# Patient Record
Sex: FEMALE | Race: WHITE | Employment: FULL TIME | URBAN - METROPOLITAN AREA
[De-identification: names, ages, dates, MRNs, and addresses within clinical notes are randomized per-mention and may not be internally consistent; named-entity substitution may affect disease eponyms.]

---

## 2017-01-13 ENCOUNTER — RADIANT APPOINTMENT (OUTPATIENT)
Dept: GENERAL RADIOLOGY | Facility: CLINIC | Age: 62
End: 2017-01-13
Attending: FAMILY MEDICINE
Payer: COMMERCIAL

## 2017-01-13 ENCOUNTER — OFFICE VISIT (OUTPATIENT)
Dept: FAMILY MEDICINE | Facility: CLINIC | Age: 62
End: 2017-01-13
Payer: COMMERCIAL

## 2017-01-13 VITALS
WEIGHT: 157 LBS | BODY MASS INDEX: 30.82 KG/M2 | TEMPERATURE: 98.4 F | SYSTOLIC BLOOD PRESSURE: 157 MMHG | DIASTOLIC BLOOD PRESSURE: 76 MMHG | HEART RATE: 56 BPM | HEIGHT: 60 IN

## 2017-01-13 DIAGNOSIS — R05.9 COUGH: ICD-10-CM

## 2017-01-13 DIAGNOSIS — J45.21 MILD INTERMITTENT ASTHMA WITH ACUTE EXACERBATION: ICD-10-CM

## 2017-01-13 DIAGNOSIS — J40 BRONCHITIS: ICD-10-CM

## 2017-01-13 DIAGNOSIS — R05.9 COUGH: Primary | ICD-10-CM

## 2017-01-13 PROCEDURE — 99214 OFFICE O/P EST MOD 30 MIN: CPT | Performed by: FAMILY MEDICINE

## 2017-01-13 PROCEDURE — 71020 XR CHEST 2 VW: CPT

## 2017-01-13 RX ORDER — AZITHROMYCIN 250 MG/1
250 TABLET, FILM COATED ORAL DAILY
Qty: 6 TABLET | Refills: 1 | Status: SHIPPED | OUTPATIENT
Start: 2017-01-13 | End: 2017-01-31

## 2017-01-13 RX ORDER — HYDROCHLOROTHIAZIDE 25 MG/1
TABLET ORAL
COMMUNITY
Start: 2016-07-14 | End: 2017-01-13

## 2017-01-13 RX ORDER — PREDNISONE 20 MG/1
20 TABLET ORAL DAILY
Qty: 5 TABLET | Refills: 2 | Status: SHIPPED | OUTPATIENT
Start: 2017-01-13 | End: 2017-01-31

## 2017-01-13 NOTE — MR AVS SNAPSHOT
After Visit Summary   1/13/2017    Rhonda Casiano    MRN: [id number]           Patient Information     Date Of Birth          1955        Visit Information        Provider Department      1/13/2017 8:40 AM Maritza Ramirez MD [facility]        Today's Diagnoses     Cough    -  1     Bronchitis           Care Instructions          Thank you for choosing [facility].  You may be receiving a survey in the mail from [facility] regarding your visit today.  Please take a few minutes to complete and return the survey to let us know how we are doing.      If you have questions or concerns, please contact us via [employer] or you can contact your care team at [phone].    Our Clinic hours are:  Monday 6:40 am  to 7:00 pm  Tuesday -Friday 6:40 am to 5:00 pm    The [place] outpatient lab hours are:  Monday - Friday 6:10 am to 4:45 pm  Saturdays 7:00 am to 11:00 am  Appointments are required, call [phone]    If you have clinical questions after hours or would like to schedule an appointment,  call the clinic at [phone].          Follow-ups after your visit        Who to contact     If you have questions or need follow up information about today's clinic visit or your schedule please contact [facility] directly at [phone].  Normal or non-critical lab and imaging results will be communicated to you by [employer], letter or phone within 4 business days after the clinic has received the results. If you do not hear from us within 7 days, please contact the clinic through [employer] or phone. If you have a critical or abnormal lab result, we will notify you by phone as soon as possible.  Submit refill requests through [employer] or call your pharmacy and they will forward the refill request to us. Please allow 3 business days for your refill to be completed.          Additional Information About Your Visit        [employer] Information     [employer] gives you secure  access to your electronic health record. If you see a primary care provider, you can also send messages to your care team and make appointments. If you have questions, please call your primary care clinic.  If you do not have a primary care provider, please call [phone] and they will assist you.        Care EveryWhere ID     This is your Care EveryWhere ID. This could be used by other organizations to access your [facility] medical records  [id number]        Your Vitals Were     Pulse Temperature Height BMI (Body Mass Index) Last Period       56 98.4  F (36.9  C) (Tympanic) 5' (1.524 m) 30.66 kg/m2 06/28/2005        Blood Pressure from Last 3 Encounters:   01/13/17 157/76   10/17/16 134/62   10/13/16 152/72    Weight from Last 3 Encounters:   01/13/17 157 lb (71.215 kg)   10/13/16 160 lb (72.576 kg)   05/03/16 160 lb (72.576 kg)                 Today's Medication Changes          These changes are accurate as of: 1/13/17  9:15 AM.  If you have any questions, ask your nurse or doctor.               Start taking these medicines.        Dose/Directions    azithromycin 250 MG tablet   Commonly known as:  ZITHROMAX Z-ISABELA   Used for:  Bronchitis   Started by:  Maritza Ramirez MD        Dose:  250 mg   Take 1 tablet (250 mg) by mouth daily Two tablets first day, then one tablet daily for four days   Quantity:  6 tablet   Refills:  1       predniSONE 20 MG tablet   Commonly known as:  DELTASONE   Used for:  Cough, Bronchitis   Started by:  Maritza Ramirez MD        Dose:  20 mg   Take 1 tablet (20 mg) by mouth daily   Quantity:  5 tablet   Refills:  2         These medicines have changed or have updated prescriptions.        Dose/Directions    albuterol 108 (90 BASE) MCG/ACT Inhaler   Commonly known as:  PROAIR HFA/PROVENTIL HFA/VENTOLIN HFA   This may have changed:  Another medication with the same name was removed. Continue taking this medication, and follow the directions you see here.   Used for:  Mild  intermittent asthma, uncomplicated   Changed by:  Betina Barfield DO        Dose:  2 puff   Inhale 2 puffs into the lungs every 6 hours as needed for shortness of breath / dyspnea   Quantity:  1 Inhaler   Refills:  11         Stop taking these medicines if you haven't already. Please contact your care team if you have questions.     hydrochlorothiazide 25 MG tablet   Commonly known as:  HYDRODIURIL   Stopped by:  Maritza Ramirez MD                Where to get your medicines      These medications were sent to [facility] [address] [place] [address]  [address] [place] [id number]     Phone:  [phone]    - azithromycin 250 MG tablet  - predniSONE 20 MG tablet             Primary Care Provider Office Phone # Fax #    Betina Barfield [phone]       [facility] [address] [place] [id number]        Thank you!     Thank you for choosing [facility]  for your care. Our goal is always to provide you with excellent care. Hearing back from our patients is one way we can continue to improve our services. Please take a few minutes to complete the written survey that you may receive in the mail after your visit with us. Thank you!             Your Updated Medication List - Protect others around you: Learn how to safely use, store and throw away your medicines at www.disposemymeds.org.          This list is accurate as of: 1/13/17  9:15 AM.  Always use your most recent med list.                   Brand Name Dispense Instructions for use    acyclovir 400 MG tablet    ZOVIRAX    90 tablet    ONE TABLET 3 TIMES DAILY when needed       albuterol 108 (90 BASE) MCG/ACT Inhaler    PROAIR HFA/PROVENTIL HFA/VENTOLIN HFA    1 Inhaler    Inhale 2 puffs into the lungs every 6 hours as needed for shortness of breath / dyspnea       aspirin 81 MG tablet     30 tablet    Take 81 mg by mouth every evening       azithromycin 250 MG tablet     ZITHROMAX Z-ISABELA    6 tablet    Take 1 tablet (250 mg) by mouth daily Two tablets first day, then one tablet daily for four days       BLOOD GLUCOSE TEST STRIPS STRP     one month    one touch ultra- test threee times a day       DIABETIC STERILE LANCETS device     one month    test three times a day - one touch ultra       fluticasone-salmeterol 250-50 MCG/DOSE diskus inhaler    ADVAIR DISKUS    3 Inhaler    Inhale 1 puff into the lungs 2 times daily       GLUCOSAMINE-CHONDROITIN PO      Take 1 tablet by mouth every evening       ibuprofen 600 MG tablet    ADVIL/MOTRIN     Take 600 mg by mouth every 6 hours as needed.       lisinopril 10 MG tablet    PRINIVIL/ZESTRIL    90 tablet    Take 1 tablet (10 mg) by mouth daily       MULTIVITAMIN PO      Take 1 tablet by mouth every evening       OMEPRAZOLE PO      Take 40 mg by mouth three times a week       predniSONE 20 MG tablet    DELTASONE    5 tablet    Take 1 tablet (20 mg) by mouth daily       simvastatin 20 MG tablet    ZOCOR    90 tablet    Take 1 tablet (20 mg) by mouth At Bedtime       STOOL SOFTENER PO      Take 1 tablet by mouth daily       vitamin B complex with vitamin C Tabs tablet      Take 1 tablet by mouth every evening       vitamin D 1000 UNITS capsule      Take 1 capsule by mouth daily.

## 2017-01-13 NOTE — PATIENT INSTRUCTIONS
Thank you for choosing [facility].  You may be receiving a survey in the mail from [facility] regarding your visit today.  Please take a few minutes to complete and return the survey to let us know how we are doing.      If you have questions or concerns, please contact us via [employer] or you can contact your care team at [phone].    Our Clinic hours are:  Monday 6:40 am  to 7:00 pm  Tuesday -Friday 6:40 am to 5:00 pm    The [place] outpatient lab hours are:  Monday - Friday 6:10 am to 4:45 pm  Saturdays 7:00 am to 11:00 am  Appointments are required, call [phone]    If you have clinical questions after hours or would like to schedule an appointment,  call the clinic at [phone].

## 2017-01-13 NOTE — NURSING NOTE
Chief Complaint   Patient presents with     URI     sick for 6 days       Initial /76 mmHg  Pulse 56  Temp(Src) 98.4  F (36.9  C) (Tympanic)  Ht 5' (1.524 m)  Wt 157 lb (71.215 kg)  BMI 30.66 kg/m2  LMP 06/28/2005 Estimated body mass index is 30.66 kg/(m^2) as calculated from the following:    Height as of this encounter: 5' (1.524 m).    Weight as of this encounter: 157 lb (71.215 kg).  BP completed using cuff size: regular

## 2017-01-13 NOTE — PROGRESS NOTES
a  SUBJECTIVE:                                                    Rhonda Casiano is 61 year old female   Chief Complaint   Patient presents with     URI     sick for 6 days     ENT Symptoms             Symptoms: cc Present Absent Comment   Fever/Chills  x  Fever of 101.7   Fatigue  x  Fatigued and weight loss   Muscle Aches   x    Eye Irritation   x    Sneezing   x    Nasal Alok/Drg  x     Sinus Pressure/Pain   x    Loss of smell   x    Dental pain   x    Sore Throat   x    Swollen Glands   x    Ear Pain/Fullness   x    Cough x x  Deep cough   Wheeze x x  Intermittent wheezing   Chest Pain   x    Shortness of breath x x     Rash   x    Other         Symptom duration:  6 week   Symptom severity:  Moderate   Treatments tried:  Inhalers and Nyquil   Contacts:  None         Problem list and histories reviewed & adjusted, as indicated.  Additional history: as documented    Patient Active Problem List   Diagnosis     Mild intermittent asthma     Benign essential hypertension     Other unspecified back disorder     Cystocele, midline     Herpes simplex virus (HSV) infection     Predominant disturbance of emotions     Type 2 diabetes mellitus without complication, without long-term current use of insulin (H)     HYPERLIPIDEMIA LDL GOAL <100     Advanced directives, counseling/discussion     Past Surgical History   Procedure Laterality Date     Surgical history of -        tubal ligation     Surgical history of -   2005     colonoscopy neg     Genitourinary surgery       colporrhaphy       Social History   Substance Use Topics     Smoking status: Never Smoker      Smokeless tobacco: Never Used     Alcohol Use: Yes      Comment: occas- 2-4 drinks per month     Family History   Problem Relation Age of Onset     Hypertension Mother      COPD     Respiratory Mother      emphysema     Depression Mother      Lipids Father      Hypertension Father      C.A.D. Father      quad by pass, valve replacement     HEART DISEASE Father       rheumatic fever     CANCER Maternal Grandmother      gallbladder     Arthritis Maternal Grandmother      HEART DISEASE Maternal Grandfather      CHF     DIABETES Paternal Grandmother      Respiratory Brother      Alcohol/Drug Brother      alcohol     Asthma Brother      DIABETES Brother      Lipids Brother      Eye Surgery Brother      cornea transplant     Depression Daughter      Thyroid Disease Brother      thyroid cancer     CANCER Brother      thyroid cancer     Gynecology Sister      history of molar pregnancy     Depression Sister      GASTROINTESTINAL DISEASE Sister      gallbladder disease         Current Outpatient Prescriptions   Medication Sig Dispense Refill     azithromycin (ZITHROMAX Z-ISABELA) 250 MG tablet Take 1 tablet (250 mg) by mouth daily Two tablets first day, then one tablet daily for four days 6 tablet 1     predniSONE (DELTASONE) 20 MG tablet Take 1 tablet (20 mg) by mouth daily 5 tablet 2     simvastatin (ZOCOR) 20 MG tablet Take 1 tablet (20 mg) by mouth At Bedtime 90 tablet 3     fluticasone-salmeterol (ADVAIR DISKUS) 250-50 MCG/DOSE diskus inhaler Inhale 1 puff into the lungs 2 times daily 3 Inhaler 3     albuterol (PROAIR HFA, PROVENTIL HFA, VENTOLIN HFA) 108 (90 BASE) MCG/ACT inhaler Inhale 2 puffs into the lungs every 6 hours as needed for shortness of breath / dyspnea 1 Inhaler 11     lisinopril (PRINIVIL,ZESTRIL) 10 MG tablet Take 1 tablet (10 mg) by mouth daily 90 tablet 3     GLUCOSAMINE-CHONDROITIN PO Take 1 tablet by mouth every evening       OMEPRAZOLE PO Take 40 mg by mouth three times a week       aspirin 81 MG tablet Take 81 mg by mouth every evening  30 tablet      Multiple Vitamins-Minerals (MULTIVITAMIN OR) Take 1 tablet by mouth every evening        vitamin  B complex with vitamin C (VITAMIN  B COMPLEX) TABS Take 1 tablet by mouth every evening        Docusate Calcium (STOOL SOFTENER PO) Take 1 tablet by mouth daily       acyclovir (ZOVIRAX) 400 MG tablet ONE TABLET 3  TIMES DAILY when needed 90 tablet 3     Cholecalciferol (VITAMIN D) 1000 UNITS capsule Take 1 capsule by mouth daily.       [DISCONTINUED] albuterol (2.5 MG/3ML) 0.083% nebulizer solution Take 1 vial (2.5 mg) by nebulization every 6 hours as needed for shortness of breath / dyspnea or wheezing 30 vial 0     ibuprofen (ADVIL,MOTRIN) 600 MG tablet Take 600 mg by mouth every 6 hours as needed.       DIABETIC STERILE LANCETS MISC test three times a day - one touch ultra one month one year     BLOOD GLUCOSE TEST STRIPS STRP one touch ultra- test threee times a day one month one year     No Known Allergies  Recent Labs   Lab Test  10/13/16   0740  04/03/16   1315  04/08/15   0652  07/22/14   0801   09/05/12   0745  09/22/11   0929   A1C  6.4*   --   6.5*  6.8*   < >  6.2*  6.5*   LDL  71   --   81  77   < >  106  87   HDL  49*   --   56  36*   < >  48*  41*   TRIG  110   --   121  73   < >  101  96   ALT   --   25   --    --    --   26  20   CR   --   0.84  0.79   --    < >  0.84  0.75   GFRESTIMATED   --   68  74   --    < >  70  80   GFRESTBLACK   --   83  90   --    < >  85  >90   POTASSIUM   --   3.5  3.6   --    < >  4.2  4.2   TSH  0.73   --    --    --    --   1.18   --     < > = values in this interval not displayed.      BP Readings from Last 3 Encounters:   01/13/17 157/76   10/17/16 134/62   10/13/16 152/72    Wt Readings from Last 3 Encounters:   01/13/17 157 lb (71.215 kg)   10/13/16 160 lb (72.576 kg)   05/03/16 160 lb (72.576 kg)         ROS:  Constitutional, HEENT, cardiovascular, pulmonary, gi and gu systems are negative, except as otherwise noted.    OBJECTIVE:                                                    /76 mmHg  Pulse 56  Temp(Src) 98.4  F (36.9  C) (Tympanic)  Ht 5' (1.524 m)  Wt 157 lb (71.215 kg)  BMI 30.66 kg/m2  LMP 06/28/2005  GENERAL APPEARANCE ADULT: alert, appears ill, no distress  HENT: right TM abnormal, dull, left TM abnormal, dull, throat/mouth:mild erythema, mucous  membranes moist,  cobblestoning and thick mucous posterior pharynx.  RESP: lungs clear to auscultation , rhonchi clear with cough, expiratory wheezes on right  CV: normal rate, regular rhythm, no murmur or gallop  Diagnostic Test Results:  Chest xray appears clear     ASSESSMENT/PLAN:                                                    1. Cough  History of intermittent asthma, with exaberation  - XR Chest 2 Views; Future    2. Bronchitis  Viral vs bacterial.  Trial of antibiotics but if not effective viral infection and self limiting.  If effective and recurs will repeat, see patient instructions.  - azithromycin (ZITHROMAX Z-ISABELA) 250 MG tablet; Take 1 tablet (250 mg) by mouth daily Two tablets first day, then one tablet daily for four days  Dispense: 6 tablet; Refill: 1  - predniSONE (DELTASONE) 20 MG tablet; Take 1 tablet (20 mg) by mouth daily  Dispense: 5 tablet; Refill: 2      3. Mild intermittent asthma with acute exacerbation  as listed above    Maritza Ramirez MD  [facility]

## 2017-01-31 ENCOUNTER — OFFICE VISIT (OUTPATIENT)
Dept: FAMILY MEDICINE | Facility: CLINIC | Age: 62
End: 2017-01-31
Payer: COMMERCIAL

## 2017-01-31 VITALS
TEMPERATURE: 98.7 F | HEART RATE: 78 BPM | DIASTOLIC BLOOD PRESSURE: 88 MMHG | BODY MASS INDEX: 31.02 KG/M2 | HEIGHT: 60 IN | RESPIRATION RATE: 20 BRPM | SYSTOLIC BLOOD PRESSURE: 168 MMHG | WEIGHT: 158 LBS

## 2017-01-31 DIAGNOSIS — K04.7 DENTAL ABSCESS: Primary | ICD-10-CM

## 2017-01-31 DIAGNOSIS — R91.8 PULMONARY NODULES: ICD-10-CM

## 2017-01-31 PROCEDURE — 99213 OFFICE O/P EST LOW 20 MIN: CPT | Performed by: INTERNAL MEDICINE

## 2017-01-31 NOTE — NURSING NOTE
Chief Complaint   Patient presents with     Dental Problem     abcess and can't see her doctor for a week.  Needs antibiotic       Initial /88 mmHg  Pulse 78  Temp(Src) 98.7  F (37.1  C) (Tympanic)  Resp 20  Ht 5' (1.524 m)  Wt 158 lb (71.668 kg)  BMI 30.86 kg/m2  LMP 06/28/2005 Estimated body mass index is 30.86 kg/(m^2) as calculated from the following:    Height as of this encounter: 5' (1.524 m).    Weight as of this encounter: 158 lb (71.668 kg).  BP completed using cuff size: large

## 2017-01-31 NOTE — PROGRESS NOTES
SUBJECTIVE:                                                    Rhonda Casiano is a 61 year old female who presents to clinic today for the following health issues:      Chief Complaint   Patient presents with     Dental Problem     abcess and can't see her doctor for a week.  Needs antibiotic     Started last night - severe pain, swelling over right lower molar tooth.  There is significant swelling even in the face.  No fevers.  Has dental appointment 2/9.  tok 1/2 oxycodone last night which helped her sleep.  Has 5 pills left.  Taking NSAIDs        Current Outpatient Prescriptions   Medication Sig Dispense Refill     amoxicillin-clavulanate (AUGMENTIN) 875-125 MG per tablet Take 1 tablet by mouth 2 times daily 20 tablet 0     simvastatin (ZOCOR) 20 MG tablet Take 1 tablet (20 mg) by mouth At Bedtime 90 tablet 3     fluticasone-salmeterol (ADVAIR DISKUS) 250-50 MCG/DOSE diskus inhaler Inhale 1 puff into the lungs 2 times daily 3 Inhaler 3     albuterol (PROAIR HFA, PROVENTIL HFA, VENTOLIN HFA) 108 (90 BASE) MCG/ACT inhaler Inhale 2 puffs into the lungs every 6 hours as needed for shortness of breath / dyspnea 1 Inhaler 11     lisinopril (PRINIVIL,ZESTRIL) 10 MG tablet Take 1 tablet (10 mg) by mouth daily 90 tablet 3     GLUCOSAMINE-CHONDROITIN PO Take 1 tablet by mouth every evening       aspirin 81 MG tablet Take 81 mg by mouth every evening  30 tablet      Multiple Vitamins-Minerals (MULTIVITAMIN OR) Take 1 tablet by mouth every evening        vitamin  B complex with vitamin C (VITAMIN  B COMPLEX) TABS Take 1 tablet by mouth every evening        Docusate Calcium (STOOL SOFTENER PO) Take 1 tablet by mouth daily       Cholecalciferol (VITAMIN D) 1000 UNITS capsule Take 1 capsule by mouth daily.       ibuprofen (ADVIL,MOTRIN) 600 MG tablet Take 600 mg by mouth every 6 hours as needed.       OMEPRAZOLE PO Take 40 mg by mouth three times a week       acyclovir (ZOVIRAX) 400 MG tablet ONE TABLET 3 TIMES DAILY when  needed 90 tablet 3     DIABETIC STERILE LANCETS MISC test three times a day - one touch ultra one month one year     BLOOD GLUCOSE TEST STRIPS STRP one touch ultra- test threee times a day one month one year     Problem list, Medication list, Allergies, and Medical/Social/Surgical histories reviewed in [facility] and updated as appropriate.    ROS:  Constitutional, HEENT, cardiovascular, pulmonary, gi and gu systems are negative, except as otherwise noted.    OBJECTIVE:                                                    /88 mmHg  Pulse 78  Temp(Src) 98.7  F (37.1  C) (Tympanic)  Resp 20  Ht 5' (1.524 m)  Wt 158 lb (71.668 kg)  BMI 30.86 kg/m2  LMP 06/28/2005  Body mass index is 30.86 kg/(m^2).  GENERAL APPEARANCE: healthy, alert and no distress  HENT: significant facial swelling in right lower jaw area.  Tooth #28 with erythema, purulence and abscess noted.  Lymph:  No LAD in neck       ASSESSMENT/PLAN:                                                      1. Dental abscess - can call if needs more opiates.  Continue nsaids.  Keep dental apt  - amoxicillin-clavulanate (AUGMENTIN) 875-125 MG per tablet; Take 1 tablet by mouth 2 times daily  Dispense: 20 tablet; Refill: 0    2. Pulmonary nodules - due for f/u in mid-feb  - XR Chest 2 Views; Future      Betina Barfield, [facility]

## 2017-01-31 NOTE — MR AVS SNAPSHOT
After Visit Summary   1/31/2017    Rhonda Casiano    MRN: [id number]           Patient Information     Date Of Birth          1955        Visit Information        Provider Department      1/31/2017 3:40 PM Betina Barfield,  [facility]        Today's Diagnoses     Pulmonary nodules    -  1     Dental abscess           Care Instructions      Dental Abscess  An abscess is a sac of pus. A dental abscess forms when a tooth or the tissue around it becomes infected with bacteria. The bacteria can enter through a cavity or a crack in a tooth. It can also infect the gum tissue or bone around a tooth. An untreated abscess can cause the loss of the tooth. It can even spread to other parts of the body and become life-threatening.    Symptoms of a dental abscess   Signs of a dental abscess include:    Toothache, often severe    Tooth pain with hot, cold, or pressure    Pain in the gums, cheek, or jaw    Bad breath or bitter taste in the mouth    Trouble swallowing or opening the mouth    Fever    Swollen or enlarged glands in the neck  Diagnosing a dental abscess  An abscess is diagnosed by looking at your teeth and gums. You will be told if any tests, like dental X-rays, are needed.  Treating a dental abscess  Treatments for a dental abscess may include the following:    Antibiotic medicines to treat the underlying infection.    Pain relievers to help you feel more comfortable. Your health care provider may prescribe a medicine for you. Or, use over-the-counter pain relievers, like acetaminophen or ibuprofen.    Warm saltwater rinses to soothe discomfort and help clear away pus.    Root canal surgery if needed to save the tooth. With a root canal, the infected part of the tooth is removed. A special substance is then used to fill the empty space in the tooth.    Drainage of the abscess if needed. Incisions are made to allow the infected material to drain from the tooth.    Removal of  the tooth in cases of severe infection that can t be treated another way.  If the infection is severe, has spread, or doesn t respond to treatment, you may need to be admitted to a hospital.        When to call the dentist  Call your dentist right away if you have any of the following:    Fever of 100.4 F (38 C) or higher    Increased pain, redness, drainage, or swelling in the treated area    Swelling of the face or jawbone    Pain that cannot be controlled with medicines   Preventing dental abscess  To prevent another abscess in the future, keep your teeth clean and healthy. Brush twice a day and floss at least once daily. See your dentist for regular tooth cleanings. And avoid sugary foods and drinks that can lead to tooth decay.    1275-0108 [employer]. [address] [place] [address]. All rights reserved. This information is not intended as a substitute for professional medical care. Always follow your healthcare professional's instructions.              Follow-ups after your visit        Your next 10 appointments already scheduled     Jan 31, 2017  3:40 PM   [facility]    [address] [address]   [phone]            Feb 03, 2017  7:00 AM   [facility]    [address] [address]   [phone]              Future tests that were ordered for you today     Open Future Orders        Priority Expected Expires Ordered    XR Chest 2 Views Routine 1/31/2017 1/31/2018 1/31/2017            Who to contact     If you have questions or need follow up information about today's clinic visit or your schedule please contact [facility] directly at [phone].  Normal or non-critical lab and imaging results will be communicated to you by MyChart, letter or phone within 4 business days  after the clinic has received the results. If you do not hear from us within 7 days, please contact the clinic through [employer] or phone. If you have a critical or abnormal lab result, we will notify you by phone as soon as possible.  Submit refill requests through [employer] or call your pharmacy and they will forward the refill request to us. Please allow 3 business days for your refill to be completed.          Additional Information About Your Visit        Yast Information     [employer] gives you secure access to your electronic health record. If you see a primary care provider, you can also send messages to your care team and make appointments. If you have questions, please call your primary care clinic.  If you do not have a primary care provider, please call [phone] and they will assist you.        Care EveryWhere ID     This is your Care EveryWhere ID. This could be used by other organizations to access your [place] medical records  [id number]        Your Vitals Were     Pulse Temperature Respirations Height BMI (Body Mass Index) Last Period    78 98.7  F (37.1  C) (Tympanic) 20 5' (1.524 m) 30.86 kg/m2 06/28/2005       Blood Pressure from Last 3 Encounters:   01/31/17 168/88   01/13/17 157/76   10/17/16 134/62    Weight from Last 3 Encounters:   01/31/17 158 lb (71.668 kg)   01/13/17 157 lb (71.215 kg)   10/13/16 160 lb (72.576 kg)                 Today's Medication Changes          These changes are accurate as of: 1/31/17  1:07 PM.  If you have any questions, ask your nurse or doctor.               Start taking these medicines.        Dose/Directions    amoxicillin-clavulanate 875-125 MG per tablet   Commonly known as:  AUGMENTIN   Used for:  Dental abscess   Started by:  Betina Barfield, DO        Dose:  1 tablet   Take 1 tablet by mouth 2 times daily   Quantity:  20 tablet   Refills:  0         Stop taking these medicines if you haven't already. Please contact your care team if you have  questions.     azithromycin 250 MG tablet   Commonly known as:  ZITHROMAX Z-ISABELA   Stopped by:  Betina Barfield DO           predniSONE 20 MG tablet   Commonly known as:  DELTASONE   Stopped by:  Betina Barfield DO                Where to get your medicines      These medications were sent to [facility] [place], [place] - [address]  [address] [address]     Phone:  [phone]    - amoxicillin-clavulanate 875-125 MG per tablet             Primary Care Provider Office Phone # Fax #    Betina Barfield [phone]       [facility] [address] [address]        Thank you!     Thank you for choosing [facility]  for your care. Our goal is always to provide you with excellent care. Hearing back from our patients is one way we can continue to improve our services. Please take a few minutes to complete the written survey that you may receive in the mail after your visit with us. Thank you!             Your Updated Medication List - Protect others around you: Learn how to safely use, store and throw away your medicines at www.disposemymeds.org.          This list is accurate as of: 1/31/17  1:07 PM.  Always use your most recent med list.                   Brand Name Dispense Instructions for use    acyclovir 400 MG tablet    ZOVIRAX    90 tablet    ONE TABLET 3 TIMES DAILY when needed       albuterol 108 (90 BASE) MCG/ACT Inhaler    PROAIR HFA/PROVENTIL HFA/VENTOLIN HFA    1 Inhaler    Inhale 2 puffs into the lungs every 6 hours as needed for shortness of breath / dyspnea       amoxicillin-clavulanate 875-125 MG per tablet    AUGMENTIN    20 tablet    Take 1 tablet by mouth 2 times daily       aspirin 81 MG tablet     30 tablet    Take 81 mg by mouth every evening       BLOOD GLUCOSE TEST STRIPS STRP     one month    one touch ultra- test threee times a day       DIABETIC STERILE LANCETS device     one month     test three times a day - one touch ultra       fluticasone-salmeterol 250-50 MCG/DOSE diskus inhaler    ADVAIR DISKUS    3 Inhaler    Inhale 1 puff into the lungs 2 times daily       GLUCOSAMINE-CHONDROITIN PO      Take 1 tablet by mouth every evening       ibuprofen 600 MG tablet    ADVIL/MOTRIN     Take 600 mg by mouth every 6 hours as needed.       lisinopril 10 MG tablet    PRINIVIL/ZESTRIL    90 tablet    Take 1 tablet (10 mg) by mouth daily       MULTIVITAMIN PO      Take 1 tablet by mouth every evening       OMEPRAZOLE PO      Take 40 mg by mouth three times a week       simvastatin 20 MG tablet    ZOCOR    90 tablet    Take 1 tablet (20 mg) by mouth At Bedtime       STOOL SOFTENER PO      Take 1 tablet by mouth daily       vitamin B complex with vitamin C Tabs tablet      Take 1 tablet by mouth every evening       vitamin D 1000 UNITS capsule      Take 1 capsule by mouth daily.

## 2017-01-31 NOTE — PATIENT INSTRUCTIONS
Dental Abscess  An abscess is a sac of pus. A dental abscess forms when a tooth or the tissue around it becomes infected with bacteria. The bacteria can enter through a cavity or a crack in a tooth. It can also infect the gum tissue or bone around a tooth. An untreated abscess can cause the loss of the tooth. It can even spread to other parts of the body and become life-threatening.    Symptoms of a dental abscess   Signs of a dental abscess include:    Toothache, often severe    Tooth pain with hot, cold, or pressure    Pain in the gums, cheek, or jaw    Bad breath or bitter taste in the mouth    Trouble swallowing or opening the mouth    Fever    Swollen or enlarged glands in the neck  Diagnosing a dental abscess  An abscess is diagnosed by looking at your teeth and gums. You will be told if any tests, like dental X-rays, are needed.  Treating a dental abscess  Treatments for a dental abscess may include the following:    Antibiotic medicines to treat the underlying infection.    Pain relievers to help you feel more comfortable. Your health care provider may prescribe a medicine for you. Or, use over-the-counter pain relievers, like acetaminophen or ibuprofen.    Warm saltwater rinses to soothe discomfort and help clear away pus.    Root canal surgery if needed to save the tooth. With a root canal, the infected part of the tooth is removed. A special substance is then used to fill the empty space in the tooth.    Drainage of the abscess if needed. Incisions are made to allow the infected material to drain from the tooth.    Removal of the tooth in cases of severe infection that can t be treated another way.  If the infection is severe, has spread, or doesn t respond to treatment, you may need to be admitted to a hospital.        When to call the dentist  Call your dentist right away if you have any of the following:    Fever of 100.4 F (38 C) or higher    Increased pain, redness, drainage, or swelling in the  treated area    Swelling of the face or jawbone    Pain that cannot be controlled with medicines   Preventing dental abscess  To prevent another abscess in the future, keep your teeth clean and healthy. Brush twice a day and floss at least once daily. See your dentist for regular tooth cleanings. And avoid sugary foods and drinks that can lead to tooth decay.    7920-2640 [employer]. [address] [place] [address], [place] [address]. All rights reserved. This information is not intended as a substitute for professional medical care. Always follow your healthcare professional's instructions.

## 2017-02-23 ENCOUNTER — RADIANT APPOINTMENT (OUTPATIENT)
Dept: GENERAL RADIOLOGY | Facility: CLINIC | Age: 62
End: 2017-02-23
Attending: INTERNAL MEDICINE
Payer: COMMERCIAL

## 2017-02-23 DIAGNOSIS — R91.8 PULMONARY NODULES: Primary | ICD-10-CM

## 2017-02-23 DIAGNOSIS — R91.8 PULMONARY NODULES: ICD-10-CM

## 2017-02-23 PROCEDURE — 71020 XR CHEST 2 VW: CPT

## 2017-02-28 ENCOUNTER — HOSPITAL ENCOUNTER (OUTPATIENT)
Dept: CT IMAGING | Facility: CLINIC | Age: 62
Discharge: HOME | End: 2017-02-28
Attending: INTERNAL MEDICINE | Admitting: INTERNAL MEDICINE
Payer: COMMERCIAL

## 2017-02-28 DIAGNOSIS — R91.8 PULMONARY NODULES: ICD-10-CM

## 2017-02-28 PROCEDURE — 71250 CT THORAX DX C-: CPT

## 2017-03-01 DIAGNOSIS — R91.8 PULMONARY NODULES: Primary | ICD-10-CM

## 2017-03-07 DIAGNOSIS — J45.41 MODERATE PERSISTENT ASTHMA WITH ACUTE EXACERBATION: Primary | ICD-10-CM

## 2017-03-07 RX ORDER — PREDNISONE 20 MG/1
20 TABLET ORAL DAILY
Qty: 5 TABLET | Refills: 0 | Status: SHIPPED | OUTPATIENT
Start: 2017-03-07 | End: 2017-10-13

## 2017-03-15 DIAGNOSIS — I10 BENIGN ESSENTIAL HYPERTENSION: Primary | ICD-10-CM

## 2017-03-15 RX ORDER — HYDROCHLOROTHIAZIDE 25 MG/1
25 TABLET ORAL DAILY
Qty: 90 TABLET | Refills: 3 | Status: SHIPPED | OUTPATIENT
Start: 2017-03-15 | End: 2017-10-13

## 2017-03-15 NOTE — PROGRESS NOTES
Blood pressure remains high.  Restart HCTZ 25 mg once daily.  Constipation did not change while off this med.

## 2017-04-12 ENCOUNTER — ALLIED HEALTH/NURSE VISIT (OUTPATIENT)
Dept: FAMILY MEDICINE | Facility: CLINIC | Age: 62
End: 2017-04-12
Payer: COMMERCIAL

## 2017-04-12 VITALS — HEART RATE: 66 BPM | SYSTOLIC BLOOD PRESSURE: 124 MMHG | DIASTOLIC BLOOD PRESSURE: 60 MMHG

## 2017-04-12 DIAGNOSIS — Z01.30 BLOOD PRESSURE CHECK: Primary | ICD-10-CM

## 2017-04-12 PROCEDURE — 99207 ZZC NO CHARGE NURSE ONLY: CPT

## 2017-04-12 NOTE — MR AVS SNAPSHOT
After Visit Summary   4/12/2017    Rhonda Casiano    MRN: [id number]           Patient Information     Date Of Birth          1955        Visit Information        Provider Department      4/12/2017 9:00 AM TAMMIE DAVIES/LYNNE RN [facility]        Today's Diagnoses     Blood pressure check    -  1       Follow-ups after your visit        Who to contact     If you have questions or need follow up information about today's clinic visit or your schedule please contact [facility] directly at [phone].  Normal or non-critical lab and imaging results will be communicated to you by [employer], letter or phone within 4 business days after the clinic has received the results. If you do not hear from us within 7 days, please contact the clinic through [employer] or phone. If you have a critical or abnormal lab result, we will notify you by phone as soon as possible.  Submit refill requests through [employer] or call your pharmacy and they will forward the refill request to us. Please allow 3 business days for your refill to be completed.          Additional Information About Your Visit        MyChart Information     [employer] gives you secure access to your electronic health record. If you see a primary care provider, you can also send messages to your care team and make appointments. If you have questions, please call your primary care clinic.  If you do not have a primary care provider, please call [phone] and they will assist you.        Care EveryWhere ID     This is your Care EveryWhere ID. This could be used by other organizations to access your [place] medical records  [id number]        Your Vitals Were     Pulse Last Period                66 06/28/2005           Blood Pressure from Last 3 Encounters:   04/12/17 124/60   01/31/17 168/88   01/13/17 157/76    Weight from Last 3 Encounters:   01/31/17 158 lb (71.7 kg)   01/13/17 157 lb (71.2 kg)   10/13/16 160 lb (72.6 kg)               Today, you had the following     No orders found for display       Primary Care Provider Office Phone # Fax #    Betina Barfield [phone]       [facility] [address] [address]        Thank you!     Thank you for choosing [facility]  for your care. Our goal is always to provide you with excellent care. Hearing back from our patients is one way we can continue to improve our services. Please take a few minutes to complete the written survey that you may receive in the mail after your visit with us. Thank you!             Your Updated Medication List - Protect others around you: Learn how to safely use, store and throw away your medicines at www.disposemymeds.org.          This list is accurate as of: 4/12/17  9:07 AM.  Always use your most recent med list.                   Brand Name Dispense Instructions for use    acyclovir 400 MG tablet    ZOVIRAX    90 tablet    ONE TABLET 3 TIMES DAILY when needed       albuterol 108 (90 BASE) MCG/ACT Inhaler    PROAIR HFA/PROVENTIL HFA/VENTOLIN HFA    1 Inhaler    Inhale 2 puffs into the lungs every 6 hours as needed for shortness of breath / dyspnea       amoxicillin-clavulanate 875-125 MG per tablet    AUGMENTIN    20 tablet    Take 1 tablet by mouth 2 times daily       aspirin 81 MG tablet     30 tablet    Take 81 mg by mouth every evening       BLOOD GLUCOSE TEST STRIPS STRP     one month    one touch ultra- test threee times a day       DIABETIC STERILE LANCETS device     one month    test three times a day - one touch ultra       fluticasone-salmeterol 250-50 MCG/DOSE diskus inhaler    ADVAIR DISKUS    3 Inhaler    Inhale 1 puff into the lungs 2 times daily       GLUCOSAMINE-CHONDROITIN PO      Take 1 tablet by mouth every evening       hydrochlorothiazide 25 MG tablet    HYDRODIURIL    90 tablet    Take 1 tablet (25 mg) by mouth daily       ibuprofen 600 MG tablet    ADVIL/MOTRIN     Take 600  mg by mouth every 6 hours as needed.       lisinopril 10 MG tablet    PRINIVIL/ZESTRIL    90 tablet    Take 1 tablet (10 mg) by mouth daily       MULTIVITAMIN PO      Take 1 tablet by mouth every evening       OMEPRAZOLE PO      Take 40 mg by mouth three times a week       predniSONE 20 MG tablet    DELTASONE    5 tablet    Take 1 tablet (20 mg) by mouth daily       simvastatin 20 MG tablet    ZOCOR    90 tablet    Take 1 tablet (20 mg) by mouth At Bedtime       STOOL SOFTENER PO      Take 1 tablet by mouth daily       vitamin B complex with vitamin C Tabs tablet      Take 1 tablet by mouth every evening       vitamin D 1000 UNITS capsule      Take 1 capsule by mouth daily.

## 2017-04-12 NOTE — NURSING NOTE
Blood pressure check for follow up to adding HCTZ back to medications.  Patient denies symptoms of high or low blood pressures.  Blood pressure taken x1 and is at goal today.    Sherry Valente RN

## 2017-07-24 ENCOUNTER — TELEPHONE (OUTPATIENT)
Dept: FAMILY MEDICINE | Facility: CLINIC | Age: 62
End: 2017-07-24

## 2017-07-24 NOTE — TELEPHONE ENCOUNTER
Pt is due for a FIT Test and an ACT. Gave the patient both in clinic to complete.      Camille Hackett [facility]        Panel Management Review      Patient has the following on her problem list:     Asthma review     ACT Total Scores 5/3/2016   ACT TOTAL SCORE -   ASTHMA ER VISITS -   ASTHMA HOSPITALIZATIONS -   ACT TOTAL SCORE (Goal Greater than or Equal to 20) 25   In the past 12 months, how many times did you visit the emergency room for your asthma without being admitted to the hospital? 0   In the past 12 months, how many times were you hospitalized overnight because of your asthma? 0      1. Is Asthma diagnosis on the Problem List? Yes    2. Is Asthma listed on Health Maintenance? Yes    3. Patient is due for:  ACT    Diabetes    ASA: Passed    Last A1C  Lab Results   Component Value Date    A1C 6.4 10/13/2016    A1C 6.5 04/08/2015    A1C 6.8 07/22/2014    A1C 6.3 01/03/2014    A1C 6.1 07/10/2013     A1C tested: MONITOR    Last LDL:    Lab Results   Component Value Date    CHOL 142 10/13/2016     Lab Results   Component Value Date    HDL 49 10/13/2016     Lab Results   Component Value Date    LDL 71 10/13/2016     Lab Results   Component Value Date    TRIG 110 10/13/2016     Lab Results   Component Value Date    CHOLHDLRATIO 2.9 04/08/2015     Lab Results   Component Value Date    NHDL 93 10/13/2016       Is the patient on a Statin? YES             Is the patient on Aspirin? YES    Medications     HMG CoA Reductase Inhibitors    simvastatin (ZOCOR) 20 MG tablet    Salicylates    aspirin 81 MG tablet          Last three blood pressure readings:  BP Readings from Last 3 Encounters:   04/12/17 124/60   01/31/17 168/88   01/13/17 157/76       Date of last diabetes office visit: 10/13/16     Tobacco History:     History   Smoking Status     Never Smoker   Smokeless Tobacco     Never Used         Hypertension   Last three blood pressure readings:  BP Readings from Last 3 Encounters:   04/12/17 124/60   01/31/17 168/88    01/13/17 157/76     Blood pressure: Passed    HTN Guidelines:  Age 18-59 BP range:  Less than 140/90  Age 60-85 with Diabetes:  Less than 140/90  Age 60-85 without Diabetes:  less than 150/90            Composite cancer screening  Chart review shows that this patient is due/due soon for the following Fecal Colorectal (FIT)  Summary:    Patient is due/failing the following:   ACT and FIT    Action needed:   Patient needs to do ACT and FIT.    Type of outreach:    Spoke to Jovita in clinic and gave her the ACT and FIT to complete.    Questions for provider review:    None                                                                                                                                    Camille Hackett, [facility]       Chart routed to NONE .

## 2017-07-25 ASSESSMENT — ASTHMA QUESTIONNAIRES: ACT_TOTALSCORE: 25

## 2017-08-07 ENCOUNTER — TELEPHONE (OUTPATIENT)
Dept: FAMILY MEDICINE | Facility: CLINIC | Age: 62
End: 2017-08-07

## 2017-08-07 DIAGNOSIS — Z12.11 SPECIAL SCREENING FOR MALIGNANT NEOPLASMS, COLON: Primary | ICD-10-CM

## 2017-08-07 PROCEDURE — 82274 ASSAY TEST FOR BLOOD FECAL: CPT | Performed by: INTERNAL MEDICINE

## 2017-08-07 NOTE — TELEPHONE ENCOUNTER
[facility] lab calling requesting order for fit test as they received a specimen, but there is not an active order for this patient.  Order placed per health maintenance.    Sherry Valente RN

## 2017-08-08 DIAGNOSIS — Z12.11 SPECIAL SCREENING FOR MALIGNANT NEOPLASMS, COLON: ICD-10-CM

## 2017-08-08 LAB — HEMOCCULT STL QL IA: NEGATIVE

## 2017-10-10 DIAGNOSIS — E11.9 TYPE 2 DIABETES MELLITUS WITHOUT COMPLICATION, WITHOUT LONG-TERM CURRENT USE OF INSULIN (H): Primary | ICD-10-CM

## 2017-10-10 DIAGNOSIS — Z11.59 NEED FOR HEPATITIS C SCREENING TEST: ICD-10-CM

## 2017-10-13 ENCOUNTER — OFFICE VISIT (OUTPATIENT)
Dept: FAMILY MEDICINE | Facility: CLINIC | Age: 62
End: 2017-10-13
Payer: COMMERCIAL

## 2017-10-13 VITALS
TEMPERATURE: 98.4 F | BODY MASS INDEX: 32 KG/M2 | HEIGHT: 60 IN | WEIGHT: 163 LBS | DIASTOLIC BLOOD PRESSURE: 65 MMHG | HEART RATE: 53 BPM | SYSTOLIC BLOOD PRESSURE: 125 MMHG

## 2017-10-13 DIAGNOSIS — J45.20 MILD INTERMITTENT ASTHMA, UNCOMPLICATED: ICD-10-CM

## 2017-10-13 DIAGNOSIS — E78.5 HYPERLIPIDEMIA LDL GOAL <100: ICD-10-CM

## 2017-10-13 DIAGNOSIS — Z23 NEED FOR VACCINATION: ICD-10-CM

## 2017-10-13 DIAGNOSIS — I10 BENIGN ESSENTIAL HYPERTENSION: ICD-10-CM

## 2017-10-13 DIAGNOSIS — E11.9 TYPE 2 DIABETES MELLITUS WITHOUT COMPLICATION, WITHOUT LONG-TERM CURRENT USE OF INSULIN (H): ICD-10-CM

## 2017-10-13 DIAGNOSIS — Z11.59 NEED FOR HEPATITIS C SCREENING TEST: ICD-10-CM

## 2017-10-13 DIAGNOSIS — E11.9 TYPE 2 DIABETES MELLITUS WITHOUT COMPLICATION, WITHOUT LONG-TERM CURRENT USE OF INSULIN (H): Primary | ICD-10-CM

## 2017-10-13 LAB
ANION GAP SERPL CALCULATED.3IONS-SCNC: 7 MMOL/L (ref 3–14)
BUN SERPL-MCNC: 15 MG/DL (ref 7–30)
CALCIUM SERPL-MCNC: 9.2 MG/DL (ref 8.5–10.1)
CHLORIDE SERPL-SCNC: 98 MMOL/L (ref 94–109)
CHOLEST SERPL-MCNC: 154 MG/DL
CO2 SERPL-SCNC: 30 MMOL/L (ref 20–32)
CREAT SERPL-MCNC: 0.77 MG/DL (ref 0.52–1.04)
CREAT UR-MCNC: 3 MG/DL
GFR SERPL CREATININE-BSD FRML MDRD: 76 ML/MIN/1.7M2
GLUCOSE SERPL-MCNC: 121 MG/DL (ref 70–99)
HBA1C MFR BLD: 6.8 % (ref 4.3–6)
HCV AB SERPL QL IA: NONREACTIVE
HDLC SERPL-MCNC: 51 MG/DL
LDLC SERPL CALC-MCNC: 76 MG/DL
MICROALBUMIN UR-MCNC: <5 MG/L
MICROALBUMIN/CREAT UR: NORMAL MG/G CR (ref 0–25)
NONHDLC SERPL-MCNC: 103 MG/DL
POTASSIUM SERPL-SCNC: 3.6 MMOL/L (ref 3.4–5.3)
SODIUM SERPL-SCNC: 135 MMOL/L (ref 133–144)
TRIGL SERPL-MCNC: 133 MG/DL

## 2017-10-13 PROCEDURE — 99214 OFFICE O/P EST MOD 30 MIN: CPT | Mod: 25 | Performed by: INTERNAL MEDICINE

## 2017-10-13 PROCEDURE — 80048 BASIC METABOLIC PNL TOTAL CA: CPT | Performed by: INTERNAL MEDICINE

## 2017-10-13 PROCEDURE — 86803 HEPATITIS C AB TEST: CPT | Performed by: INTERNAL MEDICINE

## 2017-10-13 PROCEDURE — 90471 IMMUNIZATION ADMIN: CPT | Performed by: INTERNAL MEDICINE

## 2017-10-13 PROCEDURE — 82043 UR ALBUMIN QUANTITATIVE: CPT | Performed by: INTERNAL MEDICINE

## 2017-10-13 PROCEDURE — 83036 HEMOGLOBIN GLYCOSYLATED A1C: CPT | Performed by: INTERNAL MEDICINE

## 2017-10-13 PROCEDURE — 36415 COLL VENOUS BLD VENIPUNCTURE: CPT | Performed by: INTERNAL MEDICINE

## 2017-10-13 PROCEDURE — 90715 TDAP VACCINE 7 YRS/> IM: CPT | Performed by: INTERNAL MEDICINE

## 2017-10-13 PROCEDURE — 80061 LIPID PANEL: CPT | Performed by: INTERNAL MEDICINE

## 2017-10-13 RX ORDER — HYDROCHLOROTHIAZIDE 25 MG/1
25 TABLET ORAL DAILY
Qty: 90 TABLET | Refills: 3 | Status: SHIPPED | OUTPATIENT
Start: 2017-10-13 | End: 2018-09-04

## 2017-10-13 RX ORDER — ALBUTEROL SULFATE 90 UG/1
2 AEROSOL, METERED RESPIRATORY (INHALATION) EVERY 6 HOURS PRN
Qty: 1 INHALER | Refills: 11 | Status: SHIPPED | OUTPATIENT
Start: 2017-10-13 | End: 2018-10-12

## 2017-10-13 RX ORDER — LISINOPRIL 10 MG/1
10 TABLET ORAL DAILY
Qty: 90 TABLET | Refills: 3 | Status: SHIPPED | OUTPATIENT
Start: 2017-10-13 | End: 2018-09-04

## 2017-10-13 RX ORDER — SIMVASTATIN 20 MG
20 TABLET ORAL AT BEDTIME
Qty: 90 TABLET | Refills: 3 | Status: SHIPPED | OUTPATIENT
Start: 2017-10-13 | End: 2018-09-04

## 2017-10-13 NOTE — PROGRESS NOTES
SUBJECTIVE:   Rhonda Casiano is a 62 year old female who presents to clinic today for the following health issues:    Chief Complaint   Patient presents with     Diabetes     recheck     Lab Result Notice     go over     Health Maintenance     will bring in copy of honoring choices and scheduling eye exam      Diabetes Follow-up      Patient is checking blood sugars: not at all    Diabetic concerns: None     Symptoms of hypoglycemia (low blood sugar): none     Paresthesias (numbness or burning in feet) or sores: No     Date of last diabetic eye exam: Patient will schedule and send report    Diet controlled diabetes.  Hasn't been following diet and exercise plan as much.    Hyperlipidemia Follow-Up      Rate your low fat/cholesterol diet?: fair    Taking statin?  Yes, no muscle aches from statin    Other lipid medications/supplements?:  none    Hypertension Follow-up      Outpatient blood pressures are not being checked.    Low Salt Diet: not monitoring salt    Amount of exercise or physical activity: once in away    Problems taking medications regularly: No    Medication side effects: none        Current Outpatient Prescriptions   Medication Sig Dispense Refill     albuterol (PROAIR HFA/PROVENTIL HFA/VENTOLIN HFA) 108 (90 BASE) MCG/ACT Inhaler Inhale 2 puffs into the lungs every 6 hours as needed for shortness of breath / dyspnea 1 Inhaler 11     simvastatin (ZOCOR) 20 MG tablet Take 1 tablet (20 mg) by mouth At Bedtime 90 tablet 3     lisinopril (PRINIVIL/ZESTRIL) 10 MG tablet Take 1 tablet (10 mg) by mouth daily 90 tablet 3     hydrochlorothiazide (HYDRODIURIL) 25 MG tablet Take 1 tablet (25 mg) by mouth daily 90 tablet 3     fluticasone-salmeterol (ADVAIR DISKUS) 250-50 MCG/DOSE diskus inhaler Inhale 1 puff into the lungs 2 times daily 3 Inhaler 3     aspirin 81 MG tablet Take 81 mg by mouth every evening  30 tablet      vitamin  B complex with vitamin C (VITAMIN  B COMPLEX) TABS Take 1 tablet by mouth every  evening        [DISCONTINUED] hydrochlorothiazide (HYDRODIURIL) 25 MG tablet Take 1 tablet (25 mg) by mouth daily 90 tablet 3     [DISCONTINUED] simvastatin (ZOCOR) 20 MG tablet Take 1 tablet (20 mg) by mouth At Bedtime 90 tablet 3     [DISCONTINUED] fluticasone-salmeterol (ADVAIR DISKUS) 250-50 MCG/DOSE diskus inhaler Inhale 1 puff into the lungs 2 times daily 3 Inhaler 3     [DISCONTINUED] albuterol (PROAIR HFA, PROVENTIL HFA, VENTOLIN HFA) 108 (90 BASE) MCG/ACT inhaler Inhale 2 puffs into the lungs every 6 hours as needed for shortness of breath / dyspnea (Patient not taking: Reported on 10/13/2017) 1 Inhaler 11     [DISCONTINUED] lisinopril (PRINIVIL,ZESTRIL) 10 MG tablet Take 1 tablet (10 mg) by mouth daily 90 tablet 3     OMEPRAZOLE PO Take 40 mg by mouth three times a week       Multiple Vitamins-Minerals (MULTIVITAMIN OR) Take 1 tablet by mouth every evening        Docusate Calcium (STOOL SOFTENER PO) Take 1 tablet by mouth daily       Cholecalciferol (VITAMIN D) 1000 UNITS capsule Take 1 capsule by mouth daily.       ibuprofen (ADVIL,MOTRIN) 600 MG tablet Take 600 mg by mouth every 6 hours as needed.       DIABETIC STERILE LANCETS MISC test three times a day - one touch ultra one month one year     BLOOD GLUCOSE TEST STRIPS STRP one touch ultra- test threee times a day one month one year       Reviewed and updated as needed this visit by clinical staffTobacco  Allergies  Meds  Problems  Med Hx  Surg Hx  Fam Hx  Soc Hx        Reviewed and updated as needed this visit by Provider  Allergies  Meds  Problems         ROS:  Constitutional, HEENT, cardiovascular, pulmonary, gi and gu systems are negative, except as otherwise noted.      OBJECTIVE:   /65  Pulse 53  Temp 98.4  F (36.9  C) (Tympanic)  Ht 5' (1.524 m)  Wt 163 lb (73.9 kg)  LMP 06/28/2005  BMI 31.83 kg/m2  Body mass index is 31.83 kg/(m^2).  GENERAL APPEARANCE: healthy, alert and no distress  RESP: lungs clear to auscultation - no  rales, rhonchi or wheezes  CV: regular rates and rhythm, normal S1 S2, no S3 or S4 and no murmur, click or rub  DIABETIC FOOT EXAM: normal DP and PT pulses, no trophic changes or ulcerative lesions and normal sensory exam    Diagnostic Test Results:  Results for orders placed or performed in visit on 10/13/17 (from the past 24 hour(s))   Hemoglobin A1c   Result Value Ref Range    Hemoglobin A1C 6.8 (H) 4.3 - 6.0 %       ASSESSMENT/PLAN:     1. Type 2 diabetes mellitus without complication, without long-term current use of insulin (H) - stable.  Recheck in 1 year.  Work on diet/exercise.  - Hemoglobin A1c    2. Mild intermittent asthma, uncomplicated - stable, refill provided  - albuterol (PROAIR HFA/PROVENTIL HFA/VENTOLIN HFA) 108 (90 BASE) MCG/ACT Inhaler; Inhale 2 puffs into the lungs every 6 hours as needed for shortness of breath / dyspnea  Dispense: 1 Inhaler; Refill: 11  - fluticasone-salmeterol (ADVAIR DISKUS) 250-50 MCG/DOSE diskus inhaler; Inhale 1 puff into the lungs 2 times daily  Dispense: 3 Inhaler; Refill: 3    3. Hyperlipidemia LDL goal <100 - stable, refill provided  - simvastatin (ZOCOR) 20 MG tablet; Take 1 tablet (20 mg) by mouth At Bedtime  Dispense: 90 tablet; Refill: 3    4. Benign essential hypertension stable, refill provided-   - lisinopril (PRINIVIL/ZESTRIL) 10 MG tablet; Take 1 tablet (10 mg) by mouth daily  Dispense: 90 tablet; Refill: 3  - hydrochlorothiazide (HYDRODIURIL) 25 MG tablet; Take 1 tablet (25 mg) by mouth daily  Dispense: 90 tablet; Refill: 3    5. Need for vaccination  - TDAP VACCINE (ADACEL) [07873.002]  - 1st  Administration  [58351]        Betina Barfield, [facility]

## 2017-10-13 NOTE — MR AVS SNAPSHOT
After Visit Summary   10/13/2017    Rhonda Casiano    MRN: [id number]           Patient Information     Date Of Birth          1955        Visit Information        Provider Department      10/13/2017 10:00 AM Betina Barfield DO [facility]        Today's Diagnoses     Type 2 diabetes mellitus without complication, without long-term current use of insulin (H)    -  1    Mild intermittent asthma, uncomplicated        Hyperlipidemia LDL goal <100        Benign essential hypertension        Need for vaccination           Follow-ups after your visit        Your next 10 appointments already scheduled     Oct 13, 2017 10:00 AM CDT   Office Visit with Betina Barfield DO   [facility] ([facility])    [address] [address] [phone]           Bring a current list of meds and any records pertaining to this visit. For Physicals, please bring immunization records and any forms needing to be filled out. Please arrive 10 minutes early to complete paperwork.              Who to contact     If you have questions or need follow up information about today's clinic visit or your schedule please contact [facility] directly at [phone].  Normal or non-critical lab and imaging results will be communicated to you by MyChart, letter or phone within 4 business days after the clinic has received the results. If you do not hear from us within 7 days, please contact the clinic through [employer] or phone. If you have a critical or abnormal lab result, we will notify you by phone as soon as possible.  Submit refill requests through [employer] or call your pharmacy and they will forward the refill request to us. Please allow 3 business days for your refill to be completed.          Additional Information About Your Visit        MyChart Information     [employer] gives you secure access to your electronic health record. If you see a  primary care provider, you can also send messages to your care team and make appointments. If you have questions, please call your primary care clinic.  If you do not have a primary care provider, please call [phone] and they will assist you.        Care EveryWhere ID     This is your Care EveryWhere ID. This could be used by other organizations to access your [facility] medical records  [id number]        Your Vitals Were     Pulse Temperature Height Last Period BMI (Body Mass Index)       53 98.4  F (36.9  C) (Tympanic) 5' (1.524 m) 06/28/2005 31.83 kg/m2        Blood Pressure from Last 3 Encounters:   10/13/17 125/65   04/12/17 124/60   01/31/17 168/88    Weight from Last 3 Encounters:   10/13/17 163 lb (73.9 kg)   01/31/17 158 lb (71.7 kg)   01/13/17 157 lb (71.2 kg)              We Performed the Following     1st  Administration  [68623]     Asthma Action Plan (AAP)     Hemoglobin A1c     TDAP VACCINE (ADACEL) [28720.002]          Where to get your medicines      These medications were sent to [facility] [address]  [address] [address]     Phone:  [phone]     albuterol 108 (90 BASE) MCG/ACT Inhaler    hydrochlorothiazide 25 MG tablet    lisinopril 10 MG tablet    simvastatin 20 MG tablet         Some of these will need a paper prescription and others can be bought over the counter.  Ask your nurse if you have questions.     Bring a paper prescription for each of these medications     fluticasone-salmeterol 250-50 MCG/DOSE diskus inhaler          Primary Care Provider Office Phone # Fax #    Betinajr Barfield [phone] [address] [address]        Equal Access to Services     GAEL TOLEDO: Manolo Castro, yovani bond, qasonali casas, chepe toledo. So [facility] [phone].    ATENCIÓN: Si habla español, tiene a lopez disposición servicios gratuitos de  asistencia lingüística. Kristin al [phone].    We comply with applicable federal civil rights laws and [place] laws. We do not discriminate on the basis of race, color, national origin, age, disability, sex, sexual orientation, or gender identity.            Thank you!     Thank you for choosing [facility]  for your care. Our goal is always to provide you with excellent care. Hearing back from our patients is one way we can continue to improve our services. Please take a few minutes to complete the written survey that you may receive in the mail after your visit with us. Thank you!             Your Updated Medication List - Protect others around you: Learn how to safely use, store and throw away your medicines at www.disposemymeds.org.          This list is accurate as of: 10/13/17  8:36 AM.  Always use your most recent med list.                   Brand Name Dispense Instructions for use Diagnosis    albuterol 108 (90 BASE) MCG/ACT Inhaler    PROAIR HFA/PROVENTIL HFA/VENTOLIN HFA    1 Inhaler    Inhale 2 puffs into the lungs every 6 hours as needed for shortness of breath / dyspnea    Mild intermittent asthma, uncomplicated       aspirin 81 MG tablet     30 tablet    Take 81 mg by mouth every evening        BLOOD GLUCOSE TEST STRIPS STRP     one month    one touch ultra- test threee times a day    Abnormal glucose       DIABETIC STERILE LANCETS device     one month    test three times a day - one touch ultra    Abnormal glucose       fluticasone-salmeterol 250-50 MCG/DOSE diskus inhaler    ADVAIR DISKUS    3 Inhaler    Inhale 1 puff into the lungs 2 times daily    Mild intermittent asthma, uncomplicated       hydrochlorothiazide 25 MG tablet    HYDRODIURIL    90 tablet    Take 1 tablet (25 mg) by mouth daily    Benign essential hypertension       ibuprofen 600 MG tablet    ADVIL/MOTRIN     Take 600 mg by mouth every 6 hours as needed.        lisinopril 10 MG tablet    PRINIVIL/ZESTRIL    90 tablet     Take 1 tablet (10 mg) by mouth daily    Benign essential hypertension       MULTIVITAMIN PO      Take 1 tablet by mouth every evening        OMEPRAZOLE PO      Take 40 mg by mouth three times a week        simvastatin 20 MG tablet    ZOCOR    90 tablet    Take 1 tablet (20 mg) by mouth At Bedtime    Hyperlipidemia LDL goal <100       STOOL SOFTENER PO      Take 1 tablet by mouth daily        vitamin B complex with vitamin C Tabs tablet      Take 1 tablet by mouth every evening        vitamin D 1000 UNITS capsule      Take 1 capsule by mouth daily.

## 2017-10-13 NOTE — LETTER
My Asthma Action Plan  Name: Rhonda Casiano   YOB: 1955  Date: 10/13/2017   My doctor: Betina Barfield, DO   My clinic: [facility]        My Control Medicine: Fluticasone + salmeterol (Advair) -  Diskus 250/50 mcg twice daily  My Rescue Medicine: Albuterol (Proair/Ventolin/Proventil) inhaler as needed   My Asthma Severity: mild persistent  Avoid your asthma triggers:              GREEN ZONE   Good Control    I feel good    No cough or wheeze    Can work, sleep and play without asthma symptoms       Take your asthma control medicine every day.     1. If exercise triggers your asthma, take your rescue medication    15 minutes before exercise or sports, and    During exercise if you have asthma symptoms  2. Spacer to use with inhaler: If you have a spacer, make sure to use it with your inhaler             YELLOW ZONE Getting Worse  I have ANY of these:    I do not feel good    Cough or wheeze    Chest feels tight    Wake up at night   1. Keep taking your Green Zone medications  2. Start taking your rescue medicine:    every 20 minutes for up to 1 hour. Then every 4 hours for 24-48 hours.  3. If you stay in the Yellow Zone for more than 12-24 hours, contact your doctor.  4. If you do not return to the Green Zone in 12-24 hours or you get worse, start taking your oral steroid medicine if prescribed by your provider.           RED ZONE Medical Alert - Get Help  I have ANY of these:    I feel awful    Medicine is not helping    Breathing getting harder    Trouble walking or talking    Nose opens wide to breathe       1. Take your rescue medicine NOW  2. If your provider has prescribed an oral steroid medicine, start taking it NOW  3. Call your doctor NOW  4. If you are still in the Red Zone after 20 minutes and you have not reached your doctor:    Take your rescue medicine again and    Call 911 or go to the emergency room right away    See your regular doctor within 2 weeks of an  Emergency Room or Urgent Care visit for follow-up treatment.        Electronically signed by: Betina Barfield, October 13, 2017    Annual Reminders:  Meet with Asthma Educator,  Flu Shot in the Fall, consider Pneumonia Vaccination for patients with asthma (aged 19 and older).    Pharmacy:    [facility] [place] [place] [id number] - [place], [place] - [address] [facility]  [employer]                    Asthma Triggers  How To Control Things That Make Your Asthma Worse    Triggers are things that make your asthma worse.  Look at the list below to help you find your triggers and what you can do about them.  You can help prevent asthma flare-ups by staying away from your triggers.      Trigger                                                          What you can do   Cigarette Smoke  Tobacco smoke can make asthma worse. Do not allow smoking in your home, car or around you.  Be sure no one smokes at a child s day care or school.  If you smoke, ask your health care provider for ways to help you quit.  Ask family members to quit too.  Ask your health care provider for a referral to Quit Plan to help you quit smoking, or call 4-675-432-PLAN.     Colds, Flu, Bronchitis  These are common triggers of asthma. Wash your hands often.  Don t touch your eyes, nose or mouth.  Get a flu shot every year.     Dust Mites  These are tiny bugs that live in cloth or carpet. They are too small to see. Wash sheets and blankets in hot water every week.   Encase pillows and mattress in dust mite proof covers.  Avoid having carpet if you can. If you have carpet, vacuum weekly.   Use a dust mask and HEPA vacuum.   Pollen and Outdoor Mold  Some people are allergic to trees, grass, or weed pollen, or molds. Try to keep your windows closed.  Limit time out doors when pollen count is high.   Ask you health care provider about taking medicine during allergy season.     Animal Dander  Some  people are allergic to skin flakes, urine or saliva from pets with fur or feathers. Keep pets with fur or feathers out of your home.    If you can t keep the pet outdoors, then keep the pet out of your bedroom.  Keep the bedroom door closed.  Keep pets off cloth furniture and away from stuffed toys.     Mice, Rats, and Cockroaches  Some people are allergic to the waste from these pests.   Cover food and garbage.  Clean up spills and food crumbs.  Store grease in the refrigerator.   Keep food out of the bedroom.   Indoor Mold  This can be a trigger if your home has high moisture. Fix leaking faucets, pipes, or other sources of water.   Clean moldy surfaces.  Dehumidify basement if it is damp and smelly.   Smoke, Strong Odors, and Sprays  These can reduce air quality. Stay away from strong odors and sprays, such as perfume, powder, hair spray, paints, smoke incense, paint, cleaning products, candles and new carpet.   Exercise or Sports  Some people with asthma have this trigger. Be active!  Ask your doctor about taking medicine before sports or exercise to prevent symptoms.    Warm up for 5-10 minutes before and after sports or exercise.     Other Triggers of Asthma  Cold air:  Cover your nose and mouth with a scarf.  Sometimes laughing or crying can be a trigger.  Some medicines and food can trigger asthma.

## 2017-10-13 NOTE — NURSING NOTE
Chief Complaint   Patient presents with     Diabetes     recheck     Lab Result Notice     go over     Health Maintenance     will bring in copy of honoring choices and scheduling eye exam        Initial /65  Pulse 53  Temp 98.4  F (36.9  C) (Tympanic)  Ht 5' (1.524 m)  Wt 163 lb (73.9 kg)  LMP 06/28/2005  BMI 31.83 kg/m2 Estimated body mass index is 31.83 kg/(m^2) as calculated from the following:    Height as of this encounter: 5' (1.524 m).    Weight as of this encounter: 163 lb (73.9 kg).  Medication Reconciliation: complete

## 2018-09-04 ENCOUNTER — TELEPHONE (OUTPATIENT)
Dept: FAMILY MEDICINE | Facility: CLINIC | Age: 63
End: 2018-09-04

## 2018-09-04 DIAGNOSIS — E78.5 HYPERLIPIDEMIA LDL GOAL <100: ICD-10-CM

## 2018-09-04 DIAGNOSIS — I10 BENIGN ESSENTIAL HYPERTENSION: ICD-10-CM

## 2018-09-04 NOTE — TELEPHONE ENCOUNTER
Reason for Call:  Medication or medication refill:    Do you use a [facility]?  Name of the pharmacy and phone number for the current request:  [facility] [phone]    Name of the medication requested: Lisinopril, Simvastatin and hydrochlorothiazide     Other request: pt wondering if she could get enough until her appt in October. She will getting her physical done in mid October.    Can we leave a detailed message on this number? YES    Phone number patient can be reached at: Home number on file [phone] (home)    Best Time: any    Call taken on 9/4/2018 at 3:44 PM by Gaby Vera

## 2018-09-05 RX ORDER — HYDROCHLOROTHIAZIDE 25 MG/1
25 TABLET ORAL DAILY
Qty: 30 TABLET | Refills: 1 | Status: SHIPPED | OUTPATIENT
Start: 2018-09-05 | End: 2018-10-12

## 2018-09-05 RX ORDER — SIMVASTATIN 20 MG
20 TABLET ORAL AT BEDTIME
Qty: 30 TABLET | Refills: 1 | Status: SHIPPED | OUTPATIENT
Start: 2018-09-05 | End: 2018-10-12 | Stop reason: ALTCHOICE

## 2018-09-05 RX ORDER — LISINOPRIL 10 MG/1
10 TABLET ORAL DAILY
Qty: 30 TABLET | Refills: 1 | Status: SHIPPED | OUTPATIENT
Start: 2018-09-05 | End: 2018-10-12

## 2018-09-05 NOTE — TELEPHONE ENCOUNTER
Prescription approved per [facility] Refill Protocol.  Patient has appt 10-12-18 which includes labs.  Kely SALDIVAR RN

## 2018-09-05 NOTE — TELEPHONE ENCOUNTER
Requested Prescriptions   Pending Prescriptions Disp Refills     hydrochlorothiazide (HYDRODIURIL) 25 MG tablet 90 tablet 3     Sig: Take 1 tablet (25 mg) by mouth daily    There is no refill protocol information for this order        lisinopril (PRINIVIL/ZESTRIL) 10 MG tablet 90 tablet 3     Sig: Take 1 tablet (10 mg) by mouth daily    There is no refill protocol information for this order        simvastatin (ZOCOR) 20 MG tablet 90 tablet 3     Sig: Take 1 tablet (20 mg) by mouth At Bedtime    There is no refill protocol information for this order        BP Readings from Last 3 Encounters:   10/13/17 125/65   04/12/17 124/60   01/31/17 168/88     Creatinine   Date Value Ref Range Status   10/13/2017 0.77 0.52 - 1.04 mg/dL Final     Potassium   Date Value Ref Range Status   10/13/2017 3.6 3.4 - 5.3 mmol/L Final     Cholesterol   Date Value Ref Range Status   10/13/2017 154 <200 mg/dL Final

## 2018-09-20 ENCOUNTER — TELEPHONE (OUTPATIENT)
Dept: FAMILY MEDICINE | Facility: CLINIC | Age: 63
End: 2018-09-20

## 2018-09-20 NOTE — LETTER
September 20, 2018      Rhonda Casiano  [address]   [facility] [address]          Dear Rhonda Casiano, [id number]      At [facility] we care about your health and are committed to providing quality patient care, which includes staying current on preventativescreenings.  You can increase your chances of finding and treating diseases through regular screenings.      Our records show that you are due for the following screening(s):      PAP and Fit test (call our staff if you would like to have the fit test mail to you before your pap visit)    Fasting labs and diabetes office visit with your primary care provider (after 10/13/2018)    Mammogram (after 10/13/2018)    Asthma control test, our staff will contact you with this information, please keep the ACT (Asthma control test) form by your phone during this outreach.      Our phone to schedule the appointments above is [phone]    If you have a My-Chart Account, you also can schedule this appointment through there.    If you have already had one or all of the above screening tests at another facility, please call us so that we may update your chart.      Your partners in health,      Quality Committee   [facility]

## 2018-09-20 NOTE — TELEPHONE ENCOUNTER
Panel Management Review      Patient has the following on her problem list:     Asthma review     ACT Total Scores 7/24/2017   ACT TOTAL SCORE -   ASTHMA ER VISITS -   ASTHMA HOSPITALIZATIONS -   ACT TOTAL SCORE (Goal Greater than or Equal to 20) 25   In the past 12 months, how many times did you visit the emergency room for your asthma without being admitted to the hospital? 0   In the past 12 months, how many times were you hospitalized overnight because of your asthma? 0      1. Is Asthma diagnosis on the Problem List? Yes    2. Is Asthma listed on Health Maintenance? Yes    3. Patient is due for:  ACT    Diabetes    ASA: Passed    Last A1C  Lab Results   Component Value Date    A1C 6.8 10/13/2017    A1C 6.4 10/13/2016    A1C 6.5 04/08/2015    A1C 6.8 07/22/2014    A1C 6.3 01/03/2014     A1C tested: FAILED    Last LDL:    Lab Results   Component Value Date    CHOL 154 10/13/2017     Lab Results   Component Value Date    HDL 51 10/13/2017     Lab Results   Component Value Date    LDL 76 10/13/2017     Lab Results   Component Value Date    TRIG 133 10/13/2017     Lab Results   Component Value Date    CHOLHDLRATIO 2.9 04/08/2015     Lab Results   Component Value Date    NHDL 103 10/13/2017       Is the patient on a Statin? YES             Is the patient on Aspirin? YES    Medications     HMG CoA Reductase Inhibitors    simvastatin (ZOCOR) 20 MG tablet    Salicylates    aspirin 81 MG tablet          Last three blood pressure readings:  BP Readings from Last 3 Encounters:   10/13/17 125/65   04/12/17 124/60   01/31/17 168/88       Date of last diabetes office visit: 10/13/2017     Tobacco History:     History   Smoking Status     Never Smoker   Smokeless Tobacco     Never Used         Hypertension   Last three blood pressure readings:  BP Readings from Last 3 Encounters:   10/13/17 125/65   04/12/17 124/60   01/31/17 168/88     Blood pressure: Passed    HTN Guidelines:  Age 18-59 BP range:  Less than 140/90  Age  60-85 with Diabetes:  Less than 140/90  Age 60-85 without Diabetes:  less than 150/90      Composite cancer screening  Chart review shows that this patient is due/due soon for the following Pap Smear, Mammogram and Fecal Colorectal (FIT)  Summary:    Patient is due/failing the following:   ACT, FIT, MAMMOGRAM and PAP    Action needed:   Patient needs office visit for PAP, Fit , and Mammogram (10/13/2018) a.sl needs  to do ACT. Fasting labs, and diabetes office visit (10/13/2018)    Type of outreach:    Sent letter.    Questions for provider review:    None                                                                                                                                    Milena Keene CMA (AAMA)   (aka: Lauryn Keene)       Chart routed to Care Team .

## 2018-09-27 NOTE — TELEPHONE ENCOUNTER
Pt scheduled Mammogram and Physical with PCP.  ACT will be complete during Physical, staff will handle Fit test.  REGINO Angel)   (aka: Lauryn Keene)

## 2018-10-02 DIAGNOSIS — E11.9 TYPE 2 DIABETES MELLITUS WITHOUT COMPLICATION, WITHOUT LONG-TERM CURRENT USE OF INSULIN (H): Primary | ICD-10-CM

## 2018-10-03 ASSESSMENT — ASTHMA QUESTIONNAIRES: ACT_TOTALSCORE: 25

## 2018-10-11 DIAGNOSIS — E11.9 TYPE 2 DIABETES MELLITUS WITHOUT COMPLICATION, WITHOUT LONG-TERM CURRENT USE OF INSULIN (H): ICD-10-CM

## 2018-10-11 LAB
ANION GAP SERPL CALCULATED.3IONS-SCNC: 6 MMOL/L (ref 3–14)
BUN SERPL-MCNC: 19 MG/DL (ref 7–30)
CALCIUM SERPL-MCNC: 9 MG/DL (ref 8.5–10.1)
CHLORIDE SERPL-SCNC: 99 MMOL/L (ref 94–109)
CHOLEST SERPL-MCNC: 143 MG/DL
CO2 SERPL-SCNC: 29 MMOL/L (ref 20–32)
CREAT SERPL-MCNC: 0.85 MG/DL (ref 0.52–1.04)
CREAT UR-MCNC: 50 MG/DL
GFR SERPL CREATININE-BSD FRML MDRD: 68 ML/MIN/1.7M2
GLUCOSE SERPL-MCNC: 122 MG/DL (ref 70–99)
HBA1C MFR BLD: 6.8 % (ref 0–5.6)
HDLC SERPL-MCNC: 45 MG/DL
LDLC SERPL CALC-MCNC: 69 MG/DL
MICROALBUMIN UR-MCNC: <5 MG/L
MICROALBUMIN/CREAT UR: NORMAL MG/G CR (ref 0–25)
NONHDLC SERPL-MCNC: 98 MG/DL
POTASSIUM SERPL-SCNC: 4 MMOL/L (ref 3.4–5.3)
SODIUM SERPL-SCNC: 134 MMOL/L (ref 133–144)
TRIGL SERPL-MCNC: 143 MG/DL
TSH SERPL DL<=0.005 MIU/L-ACNC: 0.81 MU/L (ref 0.4–4)

## 2018-10-11 PROCEDURE — 80061 LIPID PANEL: CPT | Performed by: INTERNAL MEDICINE

## 2018-10-11 PROCEDURE — 80048 BASIC METABOLIC PNL TOTAL CA: CPT | Performed by: INTERNAL MEDICINE

## 2018-10-11 PROCEDURE — 83036 HEMOGLOBIN GLYCOSYLATED A1C: CPT | Performed by: INTERNAL MEDICINE

## 2018-10-11 PROCEDURE — 36415 COLL VENOUS BLD VENIPUNCTURE: CPT | Performed by: INTERNAL MEDICINE

## 2018-10-11 PROCEDURE — 84443 ASSAY THYROID STIM HORMONE: CPT | Performed by: INTERNAL MEDICINE

## 2018-10-11 PROCEDURE — 82043 UR ALBUMIN QUANTITATIVE: CPT | Performed by: INTERNAL MEDICINE

## 2018-10-12 ENCOUNTER — TRANSFERRED RECORDS (OUTPATIENT)
Dept: HEALTH INFORMATION MANAGEMENT | Facility: CLINIC | Age: 63
End: 2018-10-12

## 2018-10-12 ENCOUNTER — HOSPITAL ENCOUNTER (OUTPATIENT)
Dept: MAMMOGRAPHY | Facility: CLINIC | Age: 63
Discharge: HOME | End: 2018-10-12
Attending: INTERNAL MEDICINE | Admitting: INTERNAL MEDICINE
Payer: COMMERCIAL

## 2018-10-12 ENCOUNTER — OFFICE VISIT (OUTPATIENT)
Dept: FAMILY MEDICINE | Facility: CLINIC | Age: 63
End: 2018-10-12
Payer: COMMERCIAL

## 2018-10-12 VITALS
DIASTOLIC BLOOD PRESSURE: 68 MMHG | TEMPERATURE: 97.4 F | HEART RATE: 65 BPM | OXYGEN SATURATION: 96 % | SYSTOLIC BLOOD PRESSURE: 122 MMHG

## 2018-10-12 DIAGNOSIS — Z12.31 VISIT FOR SCREENING MAMMOGRAM: ICD-10-CM

## 2018-10-12 DIAGNOSIS — I10 BENIGN ESSENTIAL HYPERTENSION: ICD-10-CM

## 2018-10-12 DIAGNOSIS — J45.20 MILD INTERMITTENT ASTHMA, UNCOMPLICATED: ICD-10-CM

## 2018-10-12 DIAGNOSIS — Z12.11 SCREEN FOR COLON CANCER: ICD-10-CM

## 2018-10-12 DIAGNOSIS — Z00.00 ENCOUNTER FOR GENERAL ADULT MEDICAL EXAMINATION WITHOUT ABNORMAL FINDINGS: Primary | ICD-10-CM

## 2018-10-12 DIAGNOSIS — Z23 NEED FOR PROPHYLACTIC VACCINATION AND INOCULATION AGAINST INFLUENZA: ICD-10-CM

## 2018-10-12 DIAGNOSIS — Z23 NEED FOR VACCINATION: ICD-10-CM

## 2018-10-12 DIAGNOSIS — E11.9 TYPE 2 DIABETES MELLITUS WITHOUT COMPLICATION, WITHOUT LONG-TERM CURRENT USE OF INSULIN (H): ICD-10-CM

## 2018-10-12 DIAGNOSIS — Z12.4 SCREENING FOR MALIGNANT NEOPLASM OF CERVIX: ICD-10-CM

## 2018-10-12 DIAGNOSIS — E78.5 HYPERLIPIDEMIA LDL GOAL <100: ICD-10-CM

## 2018-10-12 PROCEDURE — 77067 SCR MAMMO BI INCL CAD: CPT

## 2018-10-12 PROCEDURE — 90471 IMMUNIZATION ADMIN: CPT | Performed by: INTERNAL MEDICINE

## 2018-10-12 PROCEDURE — 90750 HZV VACC RECOMBINANT IM: CPT | Performed by: INTERNAL MEDICINE

## 2018-10-12 PROCEDURE — 90682 RIV4 VACC RECOMBINANT DNA IM: CPT | Performed by: INTERNAL MEDICINE

## 2018-10-12 PROCEDURE — G0145 SCR C/V CYTO,THINLAYER,RESCR: HCPCS | Performed by: INTERNAL MEDICINE

## 2018-10-12 PROCEDURE — 99213 OFFICE O/P EST LOW 20 MIN: CPT | Mod: 25 | Performed by: INTERNAL MEDICINE

## 2018-10-12 PROCEDURE — 90472 IMMUNIZATION ADMIN EACH ADD: CPT | Performed by: INTERNAL MEDICINE

## 2018-10-12 PROCEDURE — 99396 PREV VISIT EST AGE 40-64: CPT | Mod: 25 | Performed by: INTERNAL MEDICINE

## 2018-10-12 PROCEDURE — 87624 HPV HI-RISK TYP POOLED RSLT: CPT | Performed by: INTERNAL MEDICINE

## 2018-10-12 RX ORDER — HYDROCHLOROTHIAZIDE 25 MG/1
25 TABLET ORAL DAILY
Qty: 90 TABLET | Refills: 3 | Status: SHIPPED | OUTPATIENT
Start: 2018-10-12 | End: 2019-09-19

## 2018-10-12 RX ORDER — LISINOPRIL 10 MG/1
10 TABLET ORAL DAILY
Qty: 90 TABLET | Refills: 3 | Status: SHIPPED | OUTPATIENT
Start: 2018-10-12 | End: 2019-09-19

## 2018-10-12 RX ORDER — MULTIVITAMIN WITH IRON
1 TABLET ORAL DAILY
COMMUNITY
End: 2019-09-19

## 2018-10-12 RX ORDER — ALBUTEROL SULFATE 90 UG/1
2 AEROSOL, METERED RESPIRATORY (INHALATION) EVERY 6 HOURS PRN
Qty: 1 INHALER | Refills: 11 | Status: SHIPPED | OUTPATIENT
Start: 2018-10-12 | End: 2019-09-19

## 2018-10-12 RX ORDER — ATORVASTATIN CALCIUM 20 MG/1
20 TABLET, FILM COATED ORAL DAILY
Qty: 90 TABLET | Refills: 3 | Status: SHIPPED | OUTPATIENT
Start: 2018-10-12 | End: 2019-09-19

## 2018-10-12 NOTE — MR AVS SNAPSHOT
After Visit Summary   10/12/2018    Rhonda Casiano    MRN: [id number]           Patient Information     Date Of Birth          1955        Visit Information        Provider Department      10/12/2018 8:00 AM Betina Barfield,  [facility]        Today's Diagnoses     Encounter for general adult medical examination without abnormal findings    -  1    Screen for colon cancer        Visit for screening mammogram        Screening for malignant neoplasm of cervix        Screening for HIV (human immunodeficiency virus)        Screening for diabetic peripheral neuropathy        Need for prophylactic vaccination and inoculation against influenza        Need for vaccination        Type 2 diabetes mellitus without complication, without long-term current use of insulin (H)        Hyperlipidemia LDL goal <100        Mild intermittent asthma, uncomplicated        Benign essential hypertension          Care Instructions      Preventive Health Recommendations  Female Ages 50 - 64    Yearly exam: See your health care provider every year in order to  o Review health changes.   o Discuss preventive care.    o Review your medicines if your doctor has prescribed any.      Get a Pap test every three years (unless you have an abnormal result and your provider advises testing more often).    If you get Pap tests with HPV test, you only need to test every 5 years, unless you have an abnormal result.     You do not need a Pap test if your uterus was removed (hysterectomy) and you have not had cancer.    You should be tested each year for STDs (sexually transmitted diseases) if you're at risk.     Have a mammogram every 1 to 2 years.    Have a colonoscopy at age 50, or have a yearly FIT test (stool test). These exams screen for colon cancer.      Have a cholesterol test every 5 years, or more often if advised.    Have a diabetes test (fasting glucose) every three years. If you are at risk for  diabetes, you should have this test more often.     If you are at risk for osteoporosis (brittle bone disease), think about having a bone density scan (DEXA).    Shots: Get a flu shot each year. Get a tetanus shot every 10 years.    Nutrition:     Eat at least 5 servings of fruits and vegetables each day.    Eat whole-grain bread, whole-wheat pasta and brown rice instead of white grains and rice.    Get adequate Calcium and Vitamin D.     Lifestyle    Exercise at least 150 minutes a week (30 minutes a day, 5 days a week). This will help you control your weight and prevent disease.    Limit alcohol to one drink per day.    No smoking.     Wear sunscreen to prevent skin cancer.     See your dentist every six months for an exam and cleaning.    See your eye doctor every 1 to 2 years.        1. Check blood sugar a few times/month.  2. You are due for a colonoscopy.  Please call [phone] to schedule            Follow-ups after your visit        Additional Services     GASTROENTEROLOGY ADULT REF PROCEDURE [facility] [phone]                 Who to contact     If you have questions or need follow up information about today's clinic visit or your schedule please contact [facility] directly at [phone].  Normal or non-critical lab and imaging results will be communicated to you by Heysanhart, letter or phone within 4 business days after the clinic has received the results. If you do not hear from us within 7 days, please contact the clinic through Heysanhart or phone. If you have a critical or abnormal lab result, we will notify you by phone as soon as possible.  Submit refill requests through Ratify or call your pharmacy and they will forward the refill request to us. Please allow 3 business days for your refill to be completed.          Additional Information About Your Visit        Ratify Information     Ratify gives you secure access to your electronic health record. If you see a primary  care provider, you can also send messages to your care team and make appointments. If you have questions, please call your primary care clinic.  If you do not have a primary care provider, please call [phone] and they will assist you.        Care EveryWhere ID     This is your Care EveryWhere ID. This could be used by other organizations to access your [facility] medical records  [id number]        Your Vitals Were     Pulse Temperature Last Period Pulse Oximetry Breastfeeding?       64 97.4  F (36.3  C) (Tympanic) 06/28/2005 96% No        Blood Pressure from Last 3 Encounters:   10/12/18 122/68   10/13/17 125/65   04/12/17 124/60    Weight from Last 3 Encounters:   10/12/18 (P) 164 lb (74.4 kg)   10/13/17 163 lb (73.9 kg)   01/31/17 158 lb (71.7 kg)              We Performed the Following     Asthma Action Plan (AAP)     Each additional admin.  (Right click and add QUANTITY)  [91670]     FLU VACCINE, (RIV4) RECOMBINANT HA  , IM (FluBlok, egg free) [81578]- >18 YRS (FMG recommended  50-64 YRS)     FOOT EXAM  NO CHARGE [86494.114]     GASTROENTEROLOGY ADULT REF PROCEDURE ONLY [facility] [phone]     HPV High Risk Types DNA Cervical     Pap imaged thin layer screen with HPV - recommended age 30 - 65 years (select HPV order below)     SHINGRIX [06838]     Vaccine Administration, Initial [76778]          Today's Medication Changes          These changes are accurate as of 10/12/18  9:02 AM.  If you have any questions, ask your nurse or doctor.               Start taking these medicines.        Dose/Directions    atorvastatin 20 MG tablet   Commonly known as:  LIPITOR   Used for:  Hyperlipidemia LDL goal <100   Started by:  Betina Barfield DO        Dose:  20 mg   Take 1 tablet (20 mg) by mouth daily   Quantity:  90 tablet   Refills:  3         Stop taking these medicines if you haven't already. Please contact your care team if you have questions.     BLOOD GLUCOSE TEST STRIPS STRP   Stopped by:   Betina Barfield DO           DIABETIC STERILE LANCETS device   Stopped by:  Betina Barfield DO           simvastatin 20 MG tablet   Commonly known as:  ZOCOR   Stopped by:  Betina Barfield DO                Where to get your medicines      These medications were sent [place] [facility] [place] - [address]  [address] [address]     Phone:  [phone]     albuterol 108 (90 Base) MCG/ACT inhaler    atorvastatin 20 MG tablet    hydrochlorothiazide 25 MG tablet    lisinopril 10 MG tablet                Primary Care Provider Office Phone # Fax #    Betina Val Barfield [phone]       [address] [address]        Equal Access to Services     GAEL MURRAY : Hadii robert kauro Sobreannaali, waaxda luqadaha, qaybta kaalmada adeegyada, chepe navarro . So [facility] [phone].    ATENCIÓN: Si habla español, tiene a lopez disposición servicios gratuitos de asistencia lingüística. Llame al [phone].    We comply with applicable federal civil rights laws and [place] laws. We do not discriminate on the basis of race, color, national origin, age, disability, sex, sexual orientation, or gender identity.            Thank you!     Thank you for choosing [facility]  for your care. Our goal is always to provide you with excellent care. Hearing back from our patients is one way we can continue to improve our services. Please take a few minutes to complete the written survey that you may receive in the mail after your visit with us. Thank you!             Your Updated Medication List - Protect others around you: Learn how to safely use, store and throw away your medicines at www.disposemymeds.org.          This list is accurate as of 10/12/18  9:02 AM.  Always use your most recent med list.                   Brand Name Dispense Instructions for use Diagnosis    albuterol 108 (90 Base) MCG/ACT inhaler     PROAIR HFA/PROVENTIL HFA/VENTOLIN HFA    1 Inhaler    Inhale 2 puffs into the lungs every 6 hours as needed for shortness of breath / dyspnea    Mild intermittent asthma, uncomplicated       aspirin 81 MG tablet     30 tablet    Take 81 mg by mouth every evening        atorvastatin 20 MG tablet    LIPITOR    90 tablet    Take 1 tablet (20 mg) by mouth daily    Hyperlipidemia LDL goal <100       fluticasone-salmeterol 250-50 MCG/DOSE diskus inhaler    ADVAIR DISKUS    3 Inhaler    Inhale 1 puff into the lungs 2 times daily    Mild intermittent asthma, uncomplicated       hydrochlorothiazide 25 MG tablet    HYDRODIURIL    90 tablet    Take 1 tablet (25 mg) by mouth daily    Benign essential hypertension       ibuprofen 600 MG tablet    ADVIL/MOTRIN     Take 600 mg by mouth every 6 hours as needed.        lisinopril 10 MG tablet    PRINIVIL/ZESTRIL    90 tablet    Take 1 tablet (10 mg) by mouth daily    Benign essential hypertension       magnesium 250 MG tablet      Take 1 tablet by mouth daily        MULTIVITAMIN PO      Take 1 tablet by mouth every evening        OMEPRAZOLE PO      Take 40 mg by mouth three times a week        STOOL SOFTENER PO      Take 1 tablet by mouth daily        vitamin B complex with vitamin C Tabs tablet      Take 1 tablet by mouth every evening        vitamin D 1000 units capsule      Take 1 capsule by mouth daily.

## 2018-10-12 NOTE — PATIENT INSTRUCTIONS

## 2018-10-12 NOTE — NURSING NOTE
Screening Questionnaire for Adult Immunization    Are you sick today?   Yes   Do you have allergies to medications, food, a vaccine component or latex?   No   Have you ever had a serious reaction after receiving a vaccination?   No   Do you have a long-term health problem with heart disease, lung disease, asthma, kidney disease, metabolic disease (e.g. diabetes), anemia, or other blood disorder?   No   Do you have cancer, leukemia, HIV/AIDS, or any other immune system problem?   No   In the past 3 months, have you taken medications that affect  your immune system, such as prednisone, other steroids, or anticancer drugs; drugs for the treatment of rheumatoid arthritis, Crohn s disease, or psoriasis; or have you had radiation treatments?   No   Have you had a seizure, or a brain or other nervous system problem?   No   During the past year, have you received a transfusion of blood or blood     products, or been given immune (gamma) globulin or antiviral drug?   No   For women: Are you pregnant or is there a chance you could become        pregnant during the next month?   No   Have you received any vaccinations in the past 4 weeks?   No     Immunization questionnaire answers were all negative.        Per orders of Dr. Barfield, injection of Shinrix given by Lauryn Keene. Patient instructed to remain in clinic for 15 minutes afterwards, and to report any adverse reaction to me immediately.       Screening performed by Lauryn Keene on 10/12/2018 at 7:52 AM.

## 2018-10-12 NOTE — PROGRESS NOTES
SUBJECTIVE:   CC: Rhonda Casiano is an 62 year old woman who presents for preventive health visit.     Healthy Habits:    Do you get at least three servings of calcium containing foods daily (dairy, green leafy vegetables, etc.)? Not 3 a day    Amount of exercise or daily activities, outside of work: none    Problems taking medications regularly No    Medication side effects: Yes Muscular cramp because simvastatin     Have you had an eye exam in the past two years? Yes - Today    Do you see a dentist twice per year? no    Do you have sleep apnea, excessive snoring or daytime drowsiness?no      Pt would like to have a referral for colonoscopy.    Muscle cramping - feet, thighs, hands.  Started magnesium which was somewhat helpful.  Also started Co-Q10 which was helpful.  Ran out 2 weeks ago and cramping returned.     Mole: anterior chest wall.  Thinks there is a skin tag next to a chronic mole.    Diabetes Follow-up      Patient is checking blood sugars: not at all    Diabetic concerns: None     Symptoms of hypoglycemia (low blood sugar): shaky     Paresthesias (numbness or burning in feet) or sores: No     Date of last diabetic eye exam: [facility]  2016 in [facility] - Pt going today.    BP Readings from Last 2 Encounters:   10/12/18 122/68   10/13/17 125/65     Hemoglobin A1C (%)   Date Value   10/11/2018 6.8 (H)   10/13/2017 6.8 (H)     LDL Cholesterol Calculated (mg/dL)   Date Value   10/11/2018 69   10/13/2017 76       Diabetes Management Resources    Today's PHQ-2 Score: 0  PHQ-2 ( 1999 Pfizer) 10/12/2018 10/13/2017   Q1: Little interest or pleasure in doing things 0 0   Q2: Feeling down, depressed or hopeless 0 0   PHQ-2 Score 0 0       Abuse: Current or Past(Physical, Sexual or Emotional)- No  Do you feel safe in your environment - Yes    Social History   Substance Use Topics     Smoking status: Never Smoker     Smokeless tobacco: Never Used     Alcohol use Yes      Comment: occas- 2-4 drinks per month      If you drink alcohol do you typically have >3 drinks per day or >7 drinks per week? No                     Reviewed orders with patient.  Reviewed health maintenance and updated orders accordingly - Yes  Current Outpatient Prescriptions   Medication Sig Dispense Refill     albuterol (PROAIR HFA/PROVENTIL HFA/VENTOLIN HFA) 108 (90 Base) MCG/ACT inhaler Inhale 2 puffs into the lungs every 6 hours as needed for shortness of breath / dyspnea 1 Inhaler 11     aspirin 81 MG tablet Take 81 mg by mouth every evening  30 tablet      atorvastatin (LIPITOR) 20 MG tablet Take 1 tablet (20 mg) by mouth daily 90 tablet 3     Cholecalciferol (VITAMIN D) 1000 UNITS capsule Take 1 capsule by mouth daily.       Docusate Calcium (STOOL SOFTENER PO) Take 1 tablet by mouth daily       hydrochlorothiazide (HYDRODIURIL) 25 MG tablet Take 1 tablet (25 mg) by mouth daily 90 tablet 3     lisinopril (PRINIVIL/ZESTRIL) 10 MG tablet Take 1 tablet (10 mg) by mouth daily 90 tablet 3     magnesium 250 MG tablet Take 1 tablet by mouth daily       vitamin  B complex with vitamin C (VITAMIN  B COMPLEX) TABS Take 1 tablet by mouth every evening        fluticasone-salmeterol (ADVAIR DISKUS) 250-50 MCG/DOSE diskus inhaler Inhale 1 puff into the lungs 2 times daily (Patient not taking: Reported on 10/12/2018) 3 Inhaler 3     ibuprofen (ADVIL,MOTRIN) 600 MG tablet Take 600 mg by mouth every 6 hours as needed.       Multiple Vitamins-Minerals (MULTIVITAMIN OR) Take 1 tablet by mouth every evening        OMEPRAZOLE PO Take 40 mg by mouth three times a week       [DISCONTINUED] albuterol (PROAIR HFA/PROVENTIL HFA/VENTOLIN HFA) 108 (90 BASE) MCG/ACT Inhaler Inhale 2 puffs into the lungs every 6 hours as needed for shortness of breath / dyspnea (Patient not taking: Reported on 10/12/2018) 1 Inhaler 11     [DISCONTINUED] hydrochlorothiazide (HYDRODIURIL) 25 MG tablet Take 1 tablet (25 mg) by mouth daily 30 tablet 1     [DISCONTINUED] lisinopril  (PRINIVIL/ZESTRIL) 10 MG tablet Take 1 tablet (10 mg) by mouth daily 30 tablet 1       Patient over age 50, mutual decision to screen reflected in health maintenance.    Pertinent mammograms are reviewed under the imaging tab.  History of abnormal Pap smear: NO - age 30- 65 PAP every 3 years recommended  PAP / HPV 8/26/2015 10/13/2011 11/20/2008   PAP NIL NIL NIL     Reviewed and updated as needed this visit by clinical staff  Tobacco  Allergies  Meds  Med Hx  Surg Hx  Fam Hx  Soc Hx        Reviewed and updated as needed this visit by Provider            ROS:  CONSTITUTIONAL: NEGATIVE for fever, chills, change in weight  INTEGUMENTARY/SKIN: NEGATIVE for worrisome rashes, moles or lesions  EYES: NEGATIVE for vision changes or irritation  ENT: NEGATIVE for ear, mouth and throat problems  RESP: NEGATIVE for significant cough or SOB  BREAST: NEGATIVE for masses, tenderness or discharge  CV: NEGATIVE for chest pain, palpitations or peripheral edema  GI: NEGATIVE for nausea, abdominal pain, heartburn, or change in bowel habits  : NEGATIVE for unusual urinary or vaginal symptoms. No vaginal bleeding.  MUSCULOSKELETAL: NEGATIVE for significant arthralgias or myalgia  NEURO: NEGATIVE for weakness, dizziness or paresthesias  PSYCHIATRIC: NEGATIVE for changes in mood or affect     OBJECTIVE:   /68 (BP Location: Right arm, Patient Position: Sitting, Cuff Size: Adult Regular)  Pulse 65  Temp 97.4  F (36.3  C) (Tympanic)  Ht (P) 5' (1.524 m)  Wt (P) 164 lb (74.4 kg)  LMP 06/28/2005  SpO2 96%  Breastfeeding? No  BMI (P) 32.03 kg/m2  EXAM:  GENERAL: healthy, alert and no distress  EYES: Eyes grossly normal to inspection, PERRL and conjunctivae and sclerae normal  HENT: ear canals and TM's normal, nose and mouth without ulcers or lesions  NECK: no adenopathy, no asymmetry, masses, or scars and thyroid normal to palpation  RESP: lungs clear to auscultation - no rales, rhonchi or wheezes  CV: regular rate and  rhythm, normal S1 S2, no S3 or S4, no murmur, click or rub, no peripheral edema and peripheral pulses strong  ABDOMEN: soft, nontender, no hepatosplenomegaly, no masses and bowel sounds normal   (female): normal female external genitalia, normal urethral meatus, vaginal mucosa pink, moist, well rugated, and normal cervix/adnexa/uterus without masses or discharge.  Mild bladder prolapse noted  MS: no gross musculoskeletal defects noted, no edema  SKIN: benign appearing well circumscribed flesh colored nodule on anterior chest wall with small skin tag adjacent.  NEURO: Normal strength and tone, mentation intact and speech normal  PSYCH: mentation appears normal, affect normal/bright    Diagnostic Test Results:  Results for orders placed or performed during the hospital encounter of 10/12/18   MA Screening Digital Bilateral    Narrative    MA SCREENING DIGITAL BILATERAL 10/12/2018 7:38 AM    HISTORY:  Screening.  No new breast complaints.    COMPARISON:  10/13/2016, 9/10/2015, 11/14/2012, 12/10/2010    TECHNIQUE:  Digital mammography with CAD is performed.    BREAST DENSITY: Scattered fibroglandular densities.    COMMENTS: No findings of suspicion for malignancy.       Impression    IMPRESSION: BI-RADS CATEGORY: 1 -  NEGATIVE.    RECOMMENDED FOLLOW-UP: Annual Mammography.    RIGOBERTO RAMIREZ MD       ASSESSMENT/PLAN:   1. Encounter for general adult medical examination without abnormal findings    2. Type 2 diabetes mellitus without complication, without long-term current use of insulin (H) - change statin due to possible myalgias.  Advised to check blood sugar periodically    3. Screen for colon cancer - patient wants scope  - GASTROENTEROLOGY ADULT REF PROCEDURE ONLY [facility] [phone]    4. Visit for screening mammogram - mammo done today    5. Screening for malignant neoplasm of cervix  - Pap imaged thin layer screen with HPV - recommended age 30 - 65 years (select HPV order below)  - HPV High Risk Types  DNA Cervical    6. Need for prophylactic vaccination and inoculation against influenza  - SHINGRIX [73767]  - Vaccine Administration, Initial [31235]  - FLU VACCINE, (RIV4) RECOMBINANT HA  , IM (FluBlok, egg free) [85210]- >18 YRS (FMG recommended  50-64 YRS)    7. Need for vaccination  - SHINGRIX [99570]  - Each additional admin.  (Right click and add QUANTITY)  [50102]  - FLU VACCINE, (RIV4) RECOMBINANT HA  , IM (FluBlok, egg free) [80499]- >18 YRS (FMG recommended  50-64 YRS)    8. Hyperlipidemia LDL goal <100 - see above  - atorvastatin (LIPITOR) 20 MG tablet; Take 1 tablet (20 mg) by mouth daily  Dispense: 90 tablet; Refill: 3    9. Mild intermittent asthma, uncomplicated - stable  - albuterol (PROAIR HFA/PROVENTIL HFA/VENTOLIN HFA) 108 (90 Base) MCG/ACT inhaler; Inhale 2 puffs into the lungs every 6 hours as needed for shortness of breath / dyspnea  Dispense: 1 Inhaler; Refill: 11    10. Benign essential hypertension - stable  - lisinopril (PRINIVIL/ZESTRIL) 10 MG tablet; Take 1 tablet (10 mg) by mouth daily  Dispense: 90 tablet; Refill: 3  - hydrochlorothiazide (HYDRODIURIL) 25 MG tablet; Take 1 tablet (25 mg) by mouth daily  Dispense: 90 tablet; Refill: 3    COUNSELING:   Reviewed preventive health counseling, as reflected in patient instructions    BP Readings from Last 1 Encounters:   10/12/18 122/68     Estimated body mass index is 32.03 kg/(m^2) (pended) as calculated from the following:    Height as of this encounter: (P) 5' (1.524 m).    Weight as of this encounter: (P) 164 lb (74.4 kg).      Weight management plan: Discussed healthy diet and exercise guidelines and patient will follow up in 12 months in clinic to re-evaluate.     reports that she has never smoked. She has never used smokeless tobacco.      Counseling Resources:  ATP IV Guidelines  Pooled Cohorts Equation Calculator  Breast Cancer Risk Calculator  FRAX Risk Assessment  ICSI Preventive Guidelines  Dietary Guidelines for Americans,  2010  USDA's MyPlate  ASA Prophylaxis  Lung CA Screening    Betina Barfield, DO  [facility]    Injectable Influenza Immunization Documentation    1.  Is the person to be vaccinated sick today?   No    2. Does the person to be vaccinated have an allergy to a component   of the vaccine?   No  Egg Allergy Algorithm Link    3. Has the person to be vaccinated ever had a serious reaction   to influenza vaccine in the past?   No    4. Has the person to be vaccinated ever had Guillain-Barré syndrome?   No    Form completed by REGINO Abbott

## 2018-10-16 LAB
COPATH REPORT: NORMAL
PAP: NORMAL

## 2018-10-17 LAB
FINAL DIAGNOSIS: NORMAL
HPV HR 12 DNA CVX QL NAA+PROBE: NEGATIVE
HPV16 DNA SPEC QL NAA+PROBE: NEGATIVE
HPV18 DNA SPEC QL NAA+PROBE: NEGATIVE
SPECIMEN DESCRIPTION: NORMAL
SPECIMEN SOURCE CVX/VAG CYTO: NORMAL

## 2019-01-29 ENCOUNTER — TELEPHONE (OUTPATIENT)
Dept: FAMILY MEDICINE | Facility: CLINIC | Age: 64
End: 2019-01-29

## 2019-01-29 DIAGNOSIS — B00.1 HERPES LABIALIS: Primary | ICD-10-CM

## 2019-01-29 RX ORDER — ACYCLOVIR 400 MG/1
400 TABLET ORAL EVERY 8 HOURS
Qty: 15 TABLET | Refills: 1 | Status: SHIPPED | OUTPATIENT
Start: 2019-01-29 | End: 2019-09-19

## 2019-01-29 NOTE — TELEPHONE ENCOUNTER
Dr. Barfield,    Patient has cold sore developing.  Would like her zovirax refilled but not on current med list.  Pended for your approval. Mitzi HENSLEY RN

## 2019-08-22 DIAGNOSIS — I10 BENIGN ESSENTIAL HYPERTENSION: ICD-10-CM

## 2019-08-22 RX ORDER — LISINOPRIL 10 MG/1
TABLET ORAL
Qty: 90 TABLET | Refills: 3 | OUTPATIENT
Start: 2019-08-22

## 2019-08-22 NOTE — TELEPHONE ENCOUNTER
"Requested Prescriptions   Pending Prescriptions Disp Refills     lisinopril (PRINIVIL/ZESTRIL) 10 MG tablet [Pharmacy Med Name: LISINOPRIL 10MG TABS] 90 tablet 3     Sig: TAKE ONE TABLET BY MOUTH EVERY DAY       ACE Inhibitors (Including Combos) Protocol Passed - 8/22/2019  2:13 PM        Passed - Blood pressure under 140/90 in past 12 months     BP Readings from Last 3 Encounters:   10/12/18 122/68   10/13/17 125/65   04/12/17 124/60                 Passed - Recent (12 mo) or future (30 days) visit within the authorizing provider's specialty     Patient had office visit in the last 12 months or has a visit in the next 30 days with authorizing provider or within the authorizing provider's specialty.  See \"Patient Info\" tab in inbasket, or \"Choose Columns\" in Meds & Orders section of the refill encounter.              Passed - Medication is active on med list        Passed - Patient is age 18 or older        Passed - No active pregnancy on record        Passed - Normal serum creatinine on file in past 12 months     Recent Labs   Lab Test 10/11/18  0727   CR 0.85             Passed - Normal serum potassium on file in past 12 months     Recent Labs   Lab Test 10/11/18  0727   POTASSIUM 4.0             Passed - No positive pregnancy test within past 12 months        Last Written Prescription Date:  10/12/18  Last Fill Quantity: 90,  # refills: 3   Last office visit: 10/12/2018 with prescribing provider:  Lico   Future Office Visit:   Next 5 appointments (look out 90 days)    Sep 19, 2019  6:40 AM CDT  SHORT with Betina Barfield,   [facility] ([facility]) [address] [place] [phone]  [phone]           "

## 2019-09-17 DIAGNOSIS — E11.9 TYPE 2 DIABETES MELLITUS WITHOUT COMPLICATION, WITHOUT LONG-TERM CURRENT USE OF INSULIN (H): Primary | ICD-10-CM

## 2019-09-18 NOTE — PROGRESS NOTES
Subjective     Rhnoda Casiano is a 63 year old female who presents to clinic today for the following health issues:    HPI       Pt would like to have all meds refill  90 days supply      Diabetes Follow-up      How often are you checking your blood sugar? Not checking    What time of day are you checking your blood sugars (select all that apply)?  na    Have you had any blood sugars above 200?  na    Have you had any blood sugars below 70?  na    What symptoms do you notice when your blood sugar is low?  Other: nauseas, dizzy    What concerns do you have today about your diabetes? None     Do you have any of these symptoms? (Select all that apply)  No numbness or tingling in feet.  No redness, sores or blisters on feet.  No complaints of excessive thirst.  No reports of blurry vision.  No significant changes to weight.     Have you had a diabetic eye exam in the last 12 months? Yes- Date of last eye exam: 10/12/2018 - [facility]    Diabetes Management Resources    Hyperlipidemia Follow-Up      Are you having any of the following symptoms? (Select all that apply)  No complaints of shortness of breath, chest pain or pressure.  No increased sweating or nausea with activity.  No left-sided neck or arm pain.  No complaints of pain in calves when walking 1-2 blocks.    Are you regularly taking any medication or supplement to lower your cholesterol?   Yes- atorvastatin 20 mg    Are you having muscle aches or other side effects that you think could be caused by your cholesterol lowering medication?  No    Hypertension Follow-up      Do you check your blood pressure regularly outside of the clinic? No     Are you following a low salt diet? Yes    Are your blood pressures ever more than 140 on the top number (systolic) OR more   than 90 on the bottom number (diastolic), for example 140/90? No    BP Readings from Last 2 Encounters:   09/19/19 130/60   10/12/18 122/68     Hemoglobin A1C (%)   Date Value  "  10/11/2018 6.8 (H)   10/13/2017 6.8 (H)     LDL Cholesterol Calculated (mg/dL)   Date Value   10/11/2018 69   10/13/2017 76           Current Outpatient Medications   Medication Sig Dispense Refill     aspirin 81 MG tablet Take 81 mg by mouth every evening  30 tablet      atorvastatin (LIPITOR) 20 MG tablet Take 1 tablet (20 mg) by mouth daily 90 tablet 3     Cholecalciferol (VITAMIN D) 1000 UNITS capsule Take 1 capsule by mouth daily.       hydrochlorothiazide (HYDRODIURIL) 25 MG tablet Take 1 tablet (25 mg) by mouth daily 90 tablet 3     lisinopril (PRINIVIL/ZESTRIL) 10 MG tablet Take 1 tablet (10 mg) by mouth daily 90 tablet 3     vitamin  B complex with vitamin C (VITAMIN  B COMPLEX) TABS Take 1 tablet by mouth every evening        acyclovir (ZOVIRAX) 400 MG tablet Take 1 tablet (400 mg) by mouth every 8 hours for 5 days 15 tablet 1     albuterol (PROAIR HFA/PROVENTIL HFA/VENTOLIN HFA) 108 (90 Base) MCG/ACT inhaler Inhale 2 puffs into the lungs every 6 hours as needed for shortness of breath / dyspnea (Patient not taking: Reported on 9/19/2019) 1 Inhaler 11     Docusate Calcium (STOOL SOFTENER PO) Take 1 tablet by mouth daily       fluticasone-salmeterol (ADVAIR DISKUS) 250-50 MCG/DOSE diskus inhaler Inhale 1 puff into the lungs 2 times daily (Patient not taking: Reported on 10/12/2018) 3 Inhaler 3     ibuprofen (ADVIL,MOTRIN) 600 MG tablet Take 600 mg by mouth every 6 hours as needed.       magnesium 250 MG tablet Take 1 tablet by mouth daily       Multiple Vitamins-Minerals (MULTIVITAMIN OR) Take 1 tablet by mouth every evening        OMEPRAZOLE PO Take 40 mg by mouth three times a week           Reviewed and updated as needed this visit by Provider         Review of Systems   ROS COMP: Constitutional, HEENT, cardiovascular, pulmonary, gi and gu systems are negative, except as otherwise noted.      Objective    /60   Pulse 63   Temp 97.8  F (36.6  C) (Tympanic)   Ht 1.51 m (4' 11.45\")   Wt 72.1 kg " (159 lb)   LMP 06/28/2005   SpO2 98%   Breastfeeding? No   BMI 31.63 kg/m    Body mass index is 31.63 kg/m .  Physical Exam   GENERAL APPEARANCE: healthy, alert and no distress  RESP: lungs clear to auscultation - no rales, rhonchi or wheezes  CV: regular rates and rhythm, normal S1 S2, no S3 or S4 and no murmur, click or rub  DIABETIC FOOT EXAM: normal DP and PT pulses, no trophic changes or ulcerative lesions and normal sensory exam    Diagnostic Test Results:  Labs reviewed in Epic  Results for orders placed or performed in visit on 09/19/19 (from the past 24 hour(s))   Albumin Random Urine Quantitative with Creat Ratio   Result Value Ref Range    Creatinine Urine 184 mg/dL    Albumin Urine mg/L 13 mg/L    Albumin Urine mg/g Cr 6.96 0 - 25 mg/g Cr   Lipid panel reflex to direct LDL Fasting   Result Value Ref Range    Cholesterol 134 <200 mg/dL    Triglycerides 113 <150 mg/dL    HDL Cholesterol 50 >49 mg/dL    LDL Cholesterol Calculated 61 <100 mg/dL    Non HDL Cholesterol 84 <130 mg/dL   **Basic metabolic panel FUTURE anytime   Result Value Ref Range    Sodium 136 133 - 144 mmol/L    Potassium 3.6 3.4 - 5.3 mmol/L    Chloride 100 94 - 109 mmol/L    Carbon Dioxide 31 20 - 32 mmol/L    Anion Gap 5 3 - 14 mmol/L    Glucose 146 (H) 70 - 99 mg/dL    Urea Nitrogen 17 7 - 30 mg/dL    Creatinine 0.67 0.52 - 1.04 mg/dL    GFR Estimate >90 >60 mL/min/[1.73_m2]    GFR Estimate If Black >90 >60 mL/min/[1.73_m2]    Calcium 9.3 8.5 - 10.1 mg/dL   **A1C FUTURE anytime   Result Value Ref Range    Hemoglobin A1C 6.9 (H) 0 - 5.6 %           Assessment & Plan     1. Type 2 diabetes mellitus without complication, without long-term current use of insulin (H) -well-controlled on diet.  If A1c gets in the mid sevens, would consider starting medications    2. Hyperlipidemia LDL goal <100 -stable, refill provided  - atorvastatin (LIPITOR) 20 MG tablet; Take 1 tablet (20 mg) by mouth daily  Dispense: 90 tablet; Refill: 3    3. Benign  essential hypertension -stable, refill provided  - hydrochlorothiazide (HYDRODIURIL) 25 MG tablet; Take 1 tablet (25 mg) by mouth daily  Dispense: 90 tablet; Refill: 3  - lisinopril (PRINIVIL/ZESTRIL) 10 MG tablet; Take 1 tablet (10 mg) by mouth daily  Dispense: 90 tablet; Refill: 3    4. Post-menopause -due age 64  - DX Hip/Pelvis/Spine; Future    5. Special screening for malignant neoplasms, colon -due for test  - Fecal colorectal cancer screen (FIT); Future    6. Herpes labialis -stable, refill provided  - acyclovir (ZOVIRAX) 400 MG tablet; Take 1 tablet (400 mg) by mouth every 8 hours  Dispense: 15 tablet; Refill: 3    7. Mild intermittent asthma, uncomplicated-stable, refill provided  - albuterol (PROAIR HFA/PROVENTIL HFA/VENTOLIN HFA) 108 (90 Base) MCG/ACT inhaler; Inhale 2 puffs into the lungs every 6 hours as needed for shortness of breath / dyspnea  Dispense: 1 Inhaler; Refill: 11  - fluticasone-salmeterol (ADVAIR DISKUS) 250-50 MCG/DOSE inhaler; Inhale 1 puff into the lungs 2 times daily  Dispense: 3 Inhaler; Refill: 3  - Asthma Action Plan (AAP)    8. Need for prophylactic vaccination and inoculation against influenza  - INFLUENZA QUAD, RECOMBINANT, P-FREE (RIV4) (FLUBLOCK) [37427]  - Vaccine Administration, Initial [09202]         Patient Instructions   1. Radiology test was ordered.  Please call [phone] to schedule.  2. Refills given  3. Return FIT test      Return in about 1 year (around 9/19/2020) for Diabetes Check with lab prior, Annual Wellness Check-Up.    Betina Barfield,   [facility]

## 2019-09-19 ENCOUNTER — OFFICE VISIT (OUTPATIENT)
Dept: FAMILY MEDICINE | Facility: CLINIC | Age: 64
End: 2019-09-19
Payer: COMMERCIAL

## 2019-09-19 VITALS
OXYGEN SATURATION: 98 % | WEIGHT: 159 LBS | HEIGHT: 59 IN | HEART RATE: 63 BPM | SYSTOLIC BLOOD PRESSURE: 130 MMHG | TEMPERATURE: 97.8 F | BODY MASS INDEX: 32.05 KG/M2 | DIASTOLIC BLOOD PRESSURE: 60 MMHG

## 2019-09-19 DIAGNOSIS — I10 BENIGN ESSENTIAL HYPERTENSION: ICD-10-CM

## 2019-09-19 DIAGNOSIS — E11.9 TYPE 2 DIABETES MELLITUS WITHOUT COMPLICATION, WITHOUT LONG-TERM CURRENT USE OF INSULIN (H): Primary | ICD-10-CM

## 2019-09-19 DIAGNOSIS — Z12.11 SPECIAL SCREENING FOR MALIGNANT NEOPLASMS, COLON: ICD-10-CM

## 2019-09-19 DIAGNOSIS — Z23 NEED FOR PROPHYLACTIC VACCINATION AND INOCULATION AGAINST INFLUENZA: ICD-10-CM

## 2019-09-19 DIAGNOSIS — B00.1 HERPES LABIALIS: ICD-10-CM

## 2019-09-19 DIAGNOSIS — J45.20 MILD INTERMITTENT ASTHMA, UNCOMPLICATED: ICD-10-CM

## 2019-09-19 DIAGNOSIS — E78.5 HYPERLIPIDEMIA LDL GOAL <100: ICD-10-CM

## 2019-09-19 DIAGNOSIS — E11.9 TYPE 2 DIABETES MELLITUS WITHOUT COMPLICATION, WITHOUT LONG-TERM CURRENT USE OF INSULIN (H): ICD-10-CM

## 2019-09-19 DIAGNOSIS — Z78.0 POST-MENOPAUSE: ICD-10-CM

## 2019-09-19 LAB
ANION GAP SERPL CALCULATED.3IONS-SCNC: 5 MMOL/L (ref 3–14)
BUN SERPL-MCNC: 17 MG/DL (ref 7–30)
CALCIUM SERPL-MCNC: 9.3 MG/DL (ref 8.5–10.1)
CHLORIDE SERPL-SCNC: 100 MMOL/L (ref 94–109)
CHOLEST SERPL-MCNC: 134 MG/DL
CO2 SERPL-SCNC: 31 MMOL/L (ref 20–32)
CREAT SERPL-MCNC: 0.67 MG/DL (ref 0.52–1.04)
CREAT UR-MCNC: 184 MG/DL
GFR SERPL CREATININE-BSD FRML MDRD: >90 ML/MIN/{1.73_M2}
GLUCOSE SERPL-MCNC: 146 MG/DL (ref 70–99)
HBA1C MFR BLD: 6.9 % (ref 0–5.6)
HDLC SERPL-MCNC: 50 MG/DL
LDLC SERPL CALC-MCNC: 61 MG/DL
MICROALBUMIN UR-MCNC: 13 MG/L
MICROALBUMIN/CREAT UR: 6.96 MG/G CR (ref 0–25)
NONHDLC SERPL-MCNC: 84 MG/DL
POTASSIUM SERPL-SCNC: 3.6 MMOL/L (ref 3.4–5.3)
SODIUM SERPL-SCNC: 136 MMOL/L (ref 133–144)
TRIGL SERPL-MCNC: 113 MG/DL

## 2019-09-19 PROCEDURE — 99214 OFFICE O/P EST MOD 30 MIN: CPT | Mod: 25 | Performed by: INTERNAL MEDICINE

## 2019-09-19 PROCEDURE — 90471 IMMUNIZATION ADMIN: CPT | Performed by: INTERNAL MEDICINE

## 2019-09-19 PROCEDURE — 80061 LIPID PANEL: CPT | Performed by: INTERNAL MEDICINE

## 2019-09-19 PROCEDURE — 83036 HEMOGLOBIN GLYCOSYLATED A1C: CPT | Performed by: INTERNAL MEDICINE

## 2019-09-19 PROCEDURE — 80048 BASIC METABOLIC PNL TOTAL CA: CPT | Performed by: INTERNAL MEDICINE

## 2019-09-19 PROCEDURE — 90682 RIV4 VACC RECOMBINANT DNA IM: CPT | Performed by: INTERNAL MEDICINE

## 2019-09-19 PROCEDURE — 36415 COLL VENOUS BLD VENIPUNCTURE: CPT | Performed by: INTERNAL MEDICINE

## 2019-09-19 PROCEDURE — 82043 UR ALBUMIN QUANTITATIVE: CPT | Performed by: INTERNAL MEDICINE

## 2019-09-19 PROCEDURE — 82274 ASSAY TEST FOR BLOOD FECAL: CPT | Performed by: INTERNAL MEDICINE

## 2019-09-19 RX ORDER — LISINOPRIL 10 MG/1
10 TABLET ORAL DAILY
Qty: 90 TABLET | Refills: 3 | Status: SHIPPED | OUTPATIENT
Start: 2019-09-19 | End: 2020-09-04

## 2019-09-19 RX ORDER — ATORVASTATIN CALCIUM 20 MG/1
20 TABLET, FILM COATED ORAL DAILY
Qty: 90 TABLET | Refills: 3 | Status: SHIPPED | OUTPATIENT
Start: 2019-09-19

## 2019-09-19 RX ORDER — ALBUTEROL SULFATE 90 UG/1
2 AEROSOL, METERED RESPIRATORY (INHALATION) EVERY 6 HOURS PRN
Qty: 1 INHALER | Refills: 11 | Status: SHIPPED | OUTPATIENT
Start: 2019-09-19

## 2019-09-19 RX ORDER — ACYCLOVIR 400 MG/1
400 TABLET ORAL EVERY 8 HOURS
Qty: 15 TABLET | Refills: 3 | Status: SHIPPED | OUTPATIENT
Start: 2019-09-19

## 2019-09-19 RX ORDER — HYDROCHLOROTHIAZIDE 25 MG/1
25 TABLET ORAL DAILY
Qty: 90 TABLET | Refills: 3 | Status: SHIPPED | OUTPATIENT
Start: 2019-09-19 | End: 2020-09-04

## 2019-09-19 ASSESSMENT — MIFFLIN-ST. JEOR: SCORE: 1183.97

## 2019-09-19 NOTE — PATIENT INSTRUCTIONS
1. Radiology test was ordered.  Please call [phone] to schedule.  2. Refills given  3. Return FIT test

## 2019-09-19 NOTE — LETTER
My Asthma Action Plan    Name: Rhonda Casiano   YOB: 1955  Date: 9/19/2019   My doctor: Betina Barfield, DO   My clinic: [facility]        My Control Medicine: Fluticasone propionate + salmeterol (Advair Diskus or Wixela Inhub) -  250/50 mcg twice daily  My Rescue Medicine: Albuterol (Proair/Ventolin/Proventil HFA) 2-4 puffs EVERY 4 HOURS as needed. Use a spacer if recommended by your provider.   My Asthma Severity:   Mild Persistent  Know your asthma triggers:                GREEN ZONE   Good Control    I feel good    No cough or wheeze    Can work, sleep and play without asthma symptoms       Take your asthma control medicine every day.     1. If exercise triggers your asthma, take your rescue medication    15 minutes before exercise or sports, and    During exercise if you have asthma symptoms  2. Spacer to use with inhaler: If you have a spacer, make sure to use it with your inhaler             YELLOW ZONE Getting Worse  I have ANY of these:    I do not feel good    Cough or wheeze    Chest feels tight    Wake up at night   1. Keep taking your Green Zone medications  2. Start taking your rescue medicine:    every 20 minutes for up to 1 hour. Then every 4 hours for 24-48 hours.  3. If you stay in the Yellow Zone for more than 12-24 hours, contact your doctor.  4. If you do not return to the Green Zone in 12-24 hours or you get worse, start taking your oral steroid medicine if prescribed by your provider.           RED ZONE Medical Alert - Get Help  I have ANY of these:    I feel awful    Medicine is not helping    Breathing getting harder    Trouble walking or talking    Nose opens wide to breathe       1. Take your rescue medicine NOW  2. If your provider has prescribed an oral steroid medicine, start taking it NOW  3. Call your doctor NOW  4. If you are still in the Red Zone after 20 minutes and you have not reached your doctor:    Take your rescue medicine again  and    Call 911 or go to the emergency room right away    See your regular doctor within 2 weeks of an Emergency Room or Urgent Care visit for follow-up treatment.          Annual Reminders:  Meet with Asthma Educator,  Flu Shot in the Fall, consider Pneumonia Vaccination for patients with asthma (aged 19 and older).    Pharmacy:    [facility] [place], [place] - [address] [facility] [id number] [place], [place] - [address]  [id number]                          Asthma Triggers  How To Control Things That Make Your Asthma Worse    Triggers are things that make your asthma worse.  Look at the list below to help you find your triggers and what you can do about them.  You can help prevent asthma flare-ups by staying away from your triggers.      Trigger                                                          What you can do   Cigarette Smoke  Tobacco smoke can make asthma worse. Do not allow smoking in your home, car or around you.  Be sure no one smokes at a child s day care or school.  If you smoke, ask your health care provider for ways to help you quit.  Ask family members to quit too.  Ask your health care provider for a referral to Quit Plan to help you quit smoking, or call 7-509-380-PLAN.     Colds, Flu, Bronchitis  These are common triggers of asthma. Wash your hands often.  Don t touch your eyes, nose or mouth.  Get a flu shot every year.     Dust Mites  These are tiny bugs that live in cloth or carpet. They are too small to see. Wash sheets and blankets in hot water every week.   Encase pillows and mattress in dust mite proof covers.  Avoid having carpet if you can. If you have carpet, vacuum weekly.   Use a dust mask and HEPA vacuum.   Pollen and Outdoor Mold  Some people are allergic to trees, grass, or weed pollen, or molds. Try to keep your windows closed.  Limit time out doors when pollen count is high.   Ask you health care provider about taking medicine during  allergy season.     Animal Dander  Some people are allergic to skin flakes, urine or saliva from pets with fur or feathers. Keep pets with fur or feathers out of your home.    If you can t keep the pet outdoors, then keep the pet out of your bedroom.  Keep the bedroom door closed.  Keep pets off cloth furniture and away from stuffed toys.     Mice, Rats, and Cockroaches   Some people are allergic to the waste from these pests.   Cover food and garbage.  Clean up spills and food crumbs.  Store grease in the refrigerator.   Keep food out of the bedroom.   Indoor Mold  This can be a trigger if your home has high moisture. Fix leaking faucets, pipes, or other sources of water.   Clean moldy surfaces.  Dehumidify basement if it is damp and smelly.   Smoke, Strong Odors, and Sprays  These can reduce air quality. Stay away from strong odors and sprays, such as perfume, powder, hair spray, paints, smoke incense, paint, cleaning products, candles and new carpet.   Exercise or Sports  Some people with asthma have this trigger. Be active!  Ask your doctor about taking medicine before sports or exercise to prevent symptoms.    Warm up for 5-10 minutes before and after sports or exercise.     Other Triggers of Asthma  Cold air:  Cover your nose and mouth with a scarf.  Sometimes laughing or crying can be a trigger.  Some medicines and food can trigger asthma.

## 2019-09-20 ASSESSMENT — ASTHMA QUESTIONNAIRES: ACT_TOTALSCORE: 25

## 2019-09-28 DIAGNOSIS — Z12.11 SPECIAL SCREENING FOR MALIGNANT NEOPLASMS, COLON: ICD-10-CM

## 2019-09-28 LAB — HEMOCCULT STL QL IA: NEGATIVE

## 2019-11-03 ENCOUNTER — HEALTH MAINTENANCE LETTER (OUTPATIENT)
Age: 64
End: 2019-11-03

## 2020-02-10 ENCOUNTER — HEALTH MAINTENANCE LETTER (OUTPATIENT)
Age: 65
End: 2020-02-10

## 2020-09-04 DIAGNOSIS — I10 BENIGN ESSENTIAL HYPERTENSION: ICD-10-CM

## 2020-09-04 RX ORDER — LISINOPRIL 10 MG/1
TABLET ORAL
Qty: 90 TABLET | Refills: 0 | Status: SHIPPED | OUTPATIENT
Start: 2020-09-04

## 2020-09-04 RX ORDER — HYDROCHLOROTHIAZIDE 25 MG/1
TABLET ORAL
Qty: 90 TABLET | Refills: 0 | Status: SHIPPED | OUTPATIENT
Start: 2020-09-04

## 2020-11-16 ENCOUNTER — HEALTH MAINTENANCE LETTER (OUTPATIENT)
Age: 65
End: 2020-11-16

## 2021-01-15 ENCOUNTER — HEALTH MAINTENANCE LETTER (OUTPATIENT)
Age: 66
End: 2021-01-15

## 2021-04-03 ENCOUNTER — HEALTH MAINTENANCE LETTER (OUTPATIENT)
Age: 66
End: 2021-04-03

## 2021-09-12 ENCOUNTER — HEALTH MAINTENANCE LETTER (OUTPATIENT)
Age: 66
End: 2021-09-12

## 2022-01-02 ENCOUNTER — HEALTH MAINTENANCE LETTER (OUTPATIENT)
Age: 67
End: 2022-01-02

## 2022-04-24 ENCOUNTER — HEALTH MAINTENANCE LETTER (OUTPATIENT)
Age: 67
End: 2022-04-24

## 2022-11-19 ENCOUNTER — HEALTH MAINTENANCE LETTER (OUTPATIENT)
Age: 67
End: 2022-11-19

## 2023-04-09 ENCOUNTER — HEALTH MAINTENANCE LETTER (OUTPATIENT)
Age: 68
End: 2023-04-09

## 2023-06-01 ENCOUNTER — HEALTH MAINTENANCE LETTER (OUTPATIENT)
Age: 68
End: 2023-06-01